# Patient Record
Sex: FEMALE | Race: WHITE | NOT HISPANIC OR LATINO | Employment: OTHER | URBAN - METROPOLITAN AREA
[De-identification: names, ages, dates, MRNs, and addresses within clinical notes are randomized per-mention and may not be internally consistent; named-entity substitution may affect disease eponyms.]

---

## 2019-11-22 ENCOUNTER — TELEPHONE (OUTPATIENT)
Dept: TRANSPLANT | Facility: CLINIC | Age: 59
End: 2019-11-22

## 2019-11-22 DIAGNOSIS — I50.9 CONGESTIVE HEART FAILURE, UNSPECIFIED HF CHRONICITY, UNSPECIFIED HEART FAILURE TYPE: Primary | ICD-10-CM

## 2019-12-09 ENCOUNTER — INITIAL CONSULT (OUTPATIENT)
Dept: TRANSPLANT | Facility: CLINIC | Age: 59
End: 2019-12-09
Payer: COMMERCIAL

## 2019-12-09 ENCOUNTER — CLINICAL SUPPORT (OUTPATIENT)
Dept: TRANSPLANT | Facility: CLINIC | Age: 59
End: 2019-12-09
Payer: COMMERCIAL

## 2019-12-09 ENCOUNTER — EDUCATION (OUTPATIENT)
Dept: TRANSPLANT | Facility: CLINIC | Age: 59
End: 2019-12-09

## 2019-12-09 ENCOUNTER — LAB VISIT (OUTPATIENT)
Dept: LAB | Facility: HOSPITAL | Age: 59
End: 2019-12-09
Attending: INTERNAL MEDICINE
Payer: COMMERCIAL

## 2019-12-09 ENCOUNTER — HOSPITAL ENCOUNTER (OUTPATIENT)
Dept: PULMONOLOGY | Facility: CLINIC | Age: 59
Discharge: HOME | End: 2019-12-09
Payer: COMMERCIAL

## 2019-12-09 VITALS — HEIGHT: 61 IN | BODY MASS INDEX: 24.47 KG/M2 | WEIGHT: 129.63 LBS

## 2019-12-09 VITALS — SYSTOLIC BLOOD PRESSURE: 102 MMHG | DIASTOLIC BLOOD PRESSURE: 61 MMHG | HEART RATE: 74 BPM

## 2019-12-09 DIAGNOSIS — I50.9 CONGESTIVE HEART FAILURE, UNSPECIFIED HF CHRONICITY, UNSPECIFIED HEART FAILURE TYPE: ICD-10-CM

## 2019-12-09 DIAGNOSIS — Z95.810 S/P ICD (INTERNAL CARDIAC DEFIBRILLATOR) PROCEDURE: ICD-10-CM

## 2019-12-09 DIAGNOSIS — I25.810 CORONARY ARTERY DISEASE INVOLVING CORONARY BYPASS GRAFT OF NATIVE HEART WITHOUT ANGINA PECTORIS: ICD-10-CM

## 2019-12-09 DIAGNOSIS — C50.912: ICD-10-CM

## 2019-12-09 DIAGNOSIS — Z95.1 S/P CABG (CORONARY ARTERY BYPASS GRAFT): ICD-10-CM

## 2019-12-09 DIAGNOSIS — C78.89: ICD-10-CM

## 2019-12-09 DIAGNOSIS — I50.22 CHRONIC SYSTOLIC CONGESTIVE HEART FAILURE: Primary | ICD-10-CM

## 2019-12-09 LAB
ALBUMIN SERPL BCP-MCNC: 4.3 G/DL (ref 3.5–5.2)
ALP SERPL-CCNC: 145 U/L (ref 55–135)
ALT SERPL W/O P-5'-P-CCNC: 21 U/L (ref 10–44)
ANION GAP SERPL CALC-SCNC: 6 MMOL/L (ref 8–16)
AST SERPL-CCNC: 19 U/L (ref 10–40)
BASOPHILS # BLD AUTO: 0.05 K/UL (ref 0–0.2)
BASOPHILS NFR BLD: 0.8 % (ref 0–1.9)
BILIRUB SERPL-MCNC: 0.6 MG/DL (ref 0.1–1)
BNP SERPL-MCNC: 560 PG/ML (ref 0–99)
BUN SERPL-MCNC: 13 MG/DL (ref 8–23)
CALCIUM SERPL-MCNC: 9.3 MG/DL (ref 8.7–10.5)
CHLORIDE SERPL-SCNC: 102 MMOL/L (ref 95–110)
CO2 SERPL-SCNC: 31 MMOL/L (ref 23–29)
CREAT SERPL-MCNC: 1 MG/DL (ref 0.5–1.4)
DIFFERENTIAL METHOD: ABNORMAL
EOSINOPHIL # BLD AUTO: 0.3 K/UL (ref 0–0.5)
EOSINOPHIL NFR BLD: 5.2 % (ref 0–8)
ERYTHROCYTE [DISTWIDTH] IN BLOOD BY AUTOMATED COUNT: 15.2 % (ref 11.5–14.5)
EST. GFR  (AFRICAN AMERICAN): >60 ML/MIN/1.73 M^2
EST. GFR  (NON AFRICAN AMERICAN): >60 ML/MIN/1.73 M^2
GLUCOSE SERPL-MCNC: 81 MG/DL (ref 70–110)
HCT VFR BLD AUTO: 43.3 % (ref 37–48.5)
HGB BLD-MCNC: 13.2 G/DL (ref 12–16)
IMM GRANULOCYTES # BLD AUTO: 0.01 K/UL (ref 0–0.04)
IMM GRANULOCYTES NFR BLD AUTO: 0.2 % (ref 0–0.5)
LYMPHOCYTES # BLD AUTO: 1.6 K/UL (ref 1–4.8)
LYMPHOCYTES NFR BLD: 27.2 % (ref 18–48)
MCH RBC QN AUTO: 27.6 PG (ref 27–31)
MCHC RBC AUTO-ENTMCNC: 30.5 G/DL (ref 32–36)
MCV RBC AUTO: 91 FL (ref 82–98)
MONOCYTES # BLD AUTO: 0.6 K/UL (ref 0.3–1)
MONOCYTES NFR BLD: 9.4 % (ref 4–15)
NEUTROPHILS # BLD AUTO: 3.4 K/UL (ref 1.8–7.7)
NEUTROPHILS NFR BLD: 57.2 % (ref 38–73)
NRBC BLD-RTO: 0 /100 WBC
PLATELET # BLD AUTO: 179 K/UL (ref 150–350)
PMV BLD AUTO: 11.7 FL (ref 9.2–12.9)
POTASSIUM SERPL-SCNC: 4.3 MMOL/L (ref 3.5–5.1)
PROT SERPL-MCNC: 7.2 G/DL (ref 6–8.4)
RBC # BLD AUTO: 4.78 M/UL (ref 4–5.4)
SODIUM SERPL-SCNC: 139 MMOL/L (ref 136–145)
TSH SERPL DL<=0.005 MIU/L-ACNC: 2.38 UIU/ML (ref 0.4–4)
WBC # BLD AUTO: 5.95 K/UL (ref 3.9–12.7)

## 2019-12-09 PROCEDURE — 99204 PR OFFICE/OUTPT VISIT, NEW, LEVL IV, 45-59 MIN: ICD-10-PCS | Mod: S$GLB,TXP,, | Performed by: INTERNAL MEDICINE

## 2019-12-09 PROCEDURE — 36415 COLL VENOUS BLD VENIPUNCTURE: CPT | Mod: TXP

## 2019-12-09 PROCEDURE — 84443 ASSAY THYROID STIM HORMONE: CPT | Mod: TXP

## 2019-12-09 PROCEDURE — 99204 OFFICE O/P NEW MOD 45 MIN: CPT | Mod: S$GLB,TXP,, | Performed by: INTERNAL MEDICINE

## 2019-12-09 PROCEDURE — 99999 PR PBB SHADOW E&M-EST. PATIENT-LVL III: ICD-10-PCS | Mod: PBBFAC,TXP,, | Performed by: INTERNAL MEDICINE

## 2019-12-09 PROCEDURE — 80053 COMPREHEN METABOLIC PANEL: CPT | Mod: TXP

## 2019-12-09 PROCEDURE — 99999 PR PBB SHADOW E&M-EST. PATIENT-LVL III: CPT | Mod: PBBFAC,TXP,, | Performed by: INTERNAL MEDICINE

## 2019-12-09 PROCEDURE — 83880 ASSAY OF NATRIURETIC PEPTIDE: CPT | Mod: TXP

## 2019-12-09 PROCEDURE — 94618 PULMONARY STRESS TESTING: CPT | Mod: NTX,S$GLB,, | Performed by: INTERNAL MEDICINE

## 2019-12-09 PROCEDURE — 85025 COMPLETE CBC W/AUTO DIFF WBC: CPT | Mod: TXP

## 2019-12-09 PROCEDURE — 94618 PULMONARY STRESS TESTING: ICD-10-PCS | Mod: NTX,S$GLB,, | Performed by: INTERNAL MEDICINE

## 2019-12-09 RX ORDER — SACUBITRIL AND VALSARTAN 24; 26 MG/1; MG/1
1 TABLET, FILM COATED ORAL 2 TIMES DAILY
Refills: 4 | COMMUNITY
Start: 2019-11-16

## 2019-12-09 RX ORDER — AMOXICILLIN 500 MG
CAPSULE ORAL DAILY
COMMUNITY

## 2019-12-09 RX ORDER — BETAMETHASONE DIPROPIONATE 0.5 MG/G
CREAM TOPICAL 2 TIMES DAILY
COMMUNITY

## 2019-12-09 RX ORDER — ALPRAZOLAM 1 MG/1
1 TABLET ORAL DAILY
COMMUNITY
End: 2023-10-24

## 2019-12-09 RX ORDER — FUROSEMIDE 40 MG/1
40 TABLET ORAL 2 TIMES DAILY
Refills: 1 | COMMUNITY
Start: 2019-11-16 | End: 2023-10-24

## 2019-12-09 RX ORDER — CARVEDILOL 12.5 MG/1
12.5 TABLET ORAL 2 TIMES DAILY
COMMUNITY

## 2019-12-09 RX ORDER — BIOTIN 10 MG
TABLET ORAL
COMMUNITY

## 2019-12-09 RX ORDER — UBIDECARENONE 30 MG
30 CAPSULE ORAL 3 TIMES DAILY
COMMUNITY

## 2019-12-09 RX ORDER — ASPIRIN 81 MG/1
81 TABLET ORAL DAILY
COMMUNITY

## 2019-12-09 RX ORDER — BUPROPION HYDROCHLORIDE 150 MG/1
150 TABLET ORAL DAILY
Refills: 98 | COMMUNITY
Start: 2019-10-26

## 2019-12-09 RX ORDER — CITALOPRAM 40 MG/1
20 TABLET, FILM COATED ORAL DAILY
Refills: 2 | COMMUNITY
Start: 2019-12-03

## 2019-12-09 RX ORDER — ATORVASTATIN CALCIUM 40 MG/1
40 TABLET, FILM COATED ORAL NIGHTLY
Refills: 5 | COMMUNITY
Start: 2019-11-17

## 2019-12-09 RX ORDER — POTASSIUM CHLORIDE 20 MEQ/1
20 TABLET, EXTENDED RELEASE ORAL DAILY
Refills: 0 | COMMUNITY
Start: 2019-12-02

## 2019-12-09 RX ORDER — CLOPIDOGREL BISULFATE 75 MG/1
75 TABLET ORAL DAILY
Refills: 2 | COMMUNITY
Start: 2019-12-02

## 2019-12-09 NOTE — PROGRESS NOTES
PRE-EDUCATION BOOKLET NOTE:    Met with Trixie Toth and had a brief discussion regarding the advanced heart failure evaluation process including options for heart transplantation and/or left ventricular assist device (LVAD) implantation.     Heart Transplant Educational Booklet given to patient, which included the following handouts:  · Treatment options for Advanced Heart Failure: A Referral Guide for patients  · Pre Heart Transplant Coordinator Team Contact Information   · Recipient Informed Consent   · Wellness Contract  · Multiple Listing Protocol and UNOS toll free numbers   · VAD Education Packet   · Advanced Directives  · 8 Step Plan for Heart Failure Patients     Significant History:  · Prior sternotomies: yes 2019  · ICD : Yes: [facility] placed Aug 2019  · Blood transfusions : Yes: January 2019  · Angiography : Yes: x2, [place] Nov 2018- Jan 2019   · Colonoscopy : Yes: 2008 (maybe)  in [place]  · Pap smear NA- GELACIO in 2007  · Mammogram NA- bilateral Mastesctomy 2007  · Tobacco history no  · Stroke history:  no  · Thromboembolism history:  no    Question and answer session was conducted.  Patient was accompanied by her brother and niece.  Advised to bring the educational packet to future appointments and/or admissions.  Patient was encouraged to contact the coordinator team with any questions or concerns.  Understanding verbalized.

## 2019-12-09 NOTE — PROGRESS NOTES
Subjective:   Initial evaluation of heart transplant candidacy.    HPI:  Ms. Toth is a 59 y.o. year old White female who has presents to be considered for advanced surgical options (LVAD/OHT).      History of CAD s/p CABG in 2018 subsequent angiogram revealed LIMA to LAD occluded SVG stenosis distal anastomosis SVG rca patent LV aneurysm. HF REF 2 hopsitalization for heart failure on entresto    FC III NYHA     History of breast Ca 11 years ago  CArcinoid 12 years ago            Past Medical History:   Diagnosis Date    Cancer     breast    Cardiomyopathy     CHF (congestive heart failure)     Hyperlipidemia     Hypertension     MVP (mitral valve prolapse)      Past Surgical History:   Procedure Laterality Date     SECTION      CHOLECYSTECTOMY      HYSTERECTOMY      MASTECTOMY      TONSILLECTOMY         No family history on file.    Review of Systems   Cardiovascular: Positive for dyspnea on exertion. Negative for orthopnea and paroxysmal nocturnal dyspnea.       Objective:   Blood pressure 102/61, pulse 74.body mass index is unknown because there is no height or weight on file.    Physical Exam   Constitutional: She is oriented to person, place, and time. She appears well-developed and well-nourished.   HENT:   Head: Normocephalic and atraumatic.   Mouth/Throat: No oropharyngeal exudate.   Eyes: Pupils are equal, round, and reactive to light. Conjunctivae and EOM are normal. Right eye exhibits no discharge. Left eye exhibits no discharge. No scleral icterus.   Neck: Normal range of motion. Neck supple. No JVD present. No tracheal deviation present. No thyromegaly present.   Cardiovascular: Normal rate and regular rhythm. Exam reveals gallop. Exam reveals no friction rub.   No murmur heard.  Pulmonary/Chest: Effort normal and breath sounds normal. No respiratory distress. She has no wheezes. She has no rales. She exhibits no tenderness.   Abdominal: Soft. Bowel sounds are normal. She  exhibits no distension and no mass. There is no tenderness. There is no rebound and no guarding.   Musculoskeletal: She exhibits no edema or tenderness.   Lymphadenopathy:     She has no cervical adenopathy.   Neurological: She is alert and oriented to person, place, and time. She has normal reflexes.   Skin: Skin is warm.   Psychiatric: She has a normal mood and affect. Her behavior is normal. Judgment and thought content normal.       Labs:      Chemistry        Component Value Date/Time     12/09/2019 0851    K 4.3 12/09/2019 0851     12/09/2019 0851    CO2 31 (H) 12/09/2019 0851    BUN 13 12/09/2019 0851    CREATININE 1.0 12/09/2019 0851    GLU 81 12/09/2019 0851        Component Value Date/Time    CALCIUM 9.3 12/09/2019 0851    ALKPHOS 145 (H) 12/09/2019 0851    AST 19 12/09/2019 0851    ALT 21 12/09/2019 0851    BILITOT 0.6 12/09/2019 0851    ESTGFRAFRICA >60.0 12/09/2019 0851    EGFRNONAA >60.0 12/09/2019 0851          No results found for: MG  Lab Results   Component Value Date    WBC 5.95 12/09/2019    HGB 13.2 12/09/2019    HCT 43.3 12/09/2019    MCV 91 12/09/2019     12/09/2019     BNP   Date Value Ref Range Status   12/09/2019 560 (H) 0 - 99 pg/mL Final     Comment:     Values of less than 100 pg/ml are consistent with non-CHF populations.     No results found for this or any previous visit.    Labs were reviewed with the patient.    Assessment:      1. Chronic systolic congestive heart failure    2. S/P CABG (coronary artery bypass graft)    3. Coronary artery disease involving coronary bypass graft of native heart without angina pectoris        Plan:   GISELA work up    Patient is now NYHA III ACC stage D  Recommend 2 gram sodium restriction and 1500cc fluid restriction.  Encourage physical activity with graded exercise program.  Requested patient to weigh themselves daily, and to notify us if their weight increases by more than 3 lbs in 1 day or 5 lbs in 1 week.     Transplant  Candidacy: Patient is a 59 y.o. year old female with heart failure is being seen for possible LVAD and OHT. In my opinion, she is  a suitable LVAD and OHT candidate. Patient did meet with MCS and/or pre-transplant coordinator at the end of this visit for workup. she will be work up for LVAD and GISELA       Discussed with the patient and family [facility] program outcomes as reported in INTERMACS (Interagency Registry for Mechanically Assisted Circulatory Support):    1 year survival = 92.7%  2 year survival = 86.7%  3 year survival = 86.7%    Patient and family acknowledged receipt of this information and all questions were answered.       UNOS Patient Status  Functional Status: 70% - Cares for self: unable to carry on normal activity or active work  Physical Capacity: Limited Mobility  Working for Income: No  If no, reason not working: Demands of Treatment    Alberto Canales MD

## 2019-12-09 NOTE — LETTER
December 9, 2019        Heriberto Maldonado  [address] [facility] [address]  Phone: [phone]  Fax: [phone]             [facility]  [address]  [facility] [address]  Phone: [phone]   Patient: Trixie Toth   MR Number: [id number]   YOB: 1960   Date of Visit: 12/9/2019       Dear Dr. Heriberto Maldonado    Thank you for referring Trixie Toth to me for evaluation. Attached you will find relevant portions of my assessment and plan of care.    If you have questions, please do not hesitate to call me. I look forward to following Trixie Toth along with you.    Sincerely,    Alberto Canales MD    Enclosure    If you would like to receive this communication electronically, please contact [email or web address] or [phone] to request [email or web address] access.    [email or web address] Link is a tool which provides read-only access to select patient information with whom you have a relationship. Its easy to use and provides real time access to review your patients record including encounter summaries, notes, results, and demographic information.    If you feel you have received this communication in error or would no longer like to receive these types of communications, please e-mail [email or web address]

## 2019-12-12 ENCOUNTER — TELEPHONE (OUTPATIENT)
Dept: TRANSPLANT | Facility: CLINIC | Age: 59
End: 2019-12-12

## 2019-12-12 NOTE — TELEPHONE ENCOUNTER
Contacted patient with f/u appt date; mailed slip.  Advised to bring education materials for review.  Contact information provided and encouraged to call with questions/concerns.  Understanding verbalized.

## 2020-01-06 ENCOUNTER — DOCUMENTATION ONLY (OUTPATIENT)
Dept: TRANSPLANT | Facility: CLINIC | Age: 60
End: 2020-01-06

## 2020-01-06 ENCOUNTER — OFFICE VISIT (OUTPATIENT)
Dept: TRANSPLANT | Facility: CLINIC | Age: 60
End: 2020-01-06
Payer: COMMERCIAL

## 2020-01-06 ENCOUNTER — EDUCATION (OUTPATIENT)
Dept: TRANSPLANT | Facility: CLINIC | Age: 60
End: 2020-01-06

## 2020-01-06 ENCOUNTER — CLINICAL SUPPORT (OUTPATIENT)
Dept: TRANSPLANT | Facility: CLINIC | Age: 60
End: 2020-01-06
Payer: COMMERCIAL

## 2020-01-06 ENCOUNTER — HOSPITAL ENCOUNTER (OUTPATIENT)
Facility: HOSPITAL | Age: 60
Discharge: HOME | End: 2020-01-07
Attending: INTERNAL MEDICINE | Admitting: INTERNAL MEDICINE
Payer: COMMERCIAL

## 2020-01-06 VITALS
HEART RATE: 62 BPM | SYSTOLIC BLOOD PRESSURE: 105 MMHG | DIASTOLIC BLOOD PRESSURE: 59 MMHG | WEIGHT: 134.5 LBS | HEIGHT: 61 IN | BODY MASS INDEX: 25.39 KG/M2

## 2020-01-06 DIAGNOSIS — C78.89: ICD-10-CM

## 2020-01-06 DIAGNOSIS — I50.22 CHRONIC SYSTOLIC CONGESTIVE HEART FAILURE: ICD-10-CM

## 2020-01-06 DIAGNOSIS — I50.9 HEART FAILURE: ICD-10-CM

## 2020-01-06 DIAGNOSIS — I50.9 CHF (CONGESTIVE HEART FAILURE): ICD-10-CM

## 2020-01-06 DIAGNOSIS — C50.912: ICD-10-CM

## 2020-01-06 DIAGNOSIS — I25.700 CORONARY ARTERY DISEASE INVOLVING CORONARY BYPASS GRAFT OF NATIVE HEART WITH UNSTABLE ANGINA PECTORIS: ICD-10-CM

## 2020-01-06 DIAGNOSIS — I50.20 SYSTOLIC HF (HEART FAILURE): ICD-10-CM

## 2020-01-06 DIAGNOSIS — I50.9 ACUTE ON CHRONIC CONGESTIVE HEART FAILURE, UNSPECIFIED HEART FAILURE TYPE: ICD-10-CM

## 2020-01-06 DIAGNOSIS — Z95.810 S/P ICD (INTERNAL CARDIAC DEFIBRILLATOR) PROCEDURE: Primary | ICD-10-CM

## 2020-01-06 DIAGNOSIS — I50.20 SYSTOLIC CONGESTIVE HEART FAILURE, UNSPECIFIED HF CHRONICITY: Primary | ICD-10-CM

## 2020-01-06 DIAGNOSIS — Z95.1 S/P CABG (CORONARY ARTERY BYPASS GRAFT): ICD-10-CM

## 2020-01-06 LAB
ALBUMIN SERPL BCP-MCNC: 4.2 G/DL (ref 3.5–5.2)
ALP SERPL-CCNC: 153 U/L (ref 55–135)
ALT SERPL W/O P-5'-P-CCNC: 26 U/L (ref 10–44)
ANION GAP SERPL CALC-SCNC: 9 MMOL/L (ref 8–16)
AST SERPL-CCNC: 24 U/L (ref 10–40)
BASOPHILS # BLD AUTO: 0.07 K/UL (ref 0–0.2)
BASOPHILS NFR BLD: 0.8 % (ref 0–1.9)
BILIRUB SERPL-MCNC: 0.7 MG/DL (ref 0.1–1)
BUN SERPL-MCNC: 14 MG/DL (ref 8–23)
CALCIUM SERPL-MCNC: 9.3 MG/DL (ref 8.7–10.5)
CHLORIDE SERPL-SCNC: 103 MMOL/L (ref 95–110)
CO2 SERPL-SCNC: 25 MMOL/L (ref 23–29)
CREAT SERPL-MCNC: 0.9 MG/DL (ref 0.5–1.4)
DIFFERENTIAL METHOD: ABNORMAL
EOSINOPHIL # BLD AUTO: 0.7 K/UL (ref 0–0.5)
EOSINOPHIL NFR BLD: 8.9 % (ref 0–8)
ERYTHROCYTE [DISTWIDTH] IN BLOOD BY AUTOMATED COUNT: 15.2 % (ref 11.5–14.5)
EST. GFR  (AFRICAN AMERICAN): >60 ML/MIN/1.73 M^2
EST. GFR  (NON AFRICAN AMERICAN): >60 ML/MIN/1.73 M^2
GLUCOSE SERPL-MCNC: 83 MG/DL (ref 70–110)
HCT VFR BLD AUTO: 43.1 % (ref 37–48.5)
HGB BLD-MCNC: 13.2 G/DL (ref 12–16)
IMM GRANULOCYTES # BLD AUTO: 0.02 K/UL (ref 0–0.04)
IMM GRANULOCYTES NFR BLD AUTO: 0.2 % (ref 0–0.5)
LYMPHOCYTES # BLD AUTO: 2.5 K/UL (ref 1–4.8)
LYMPHOCYTES NFR BLD: 30.2 % (ref 18–48)
MAGNESIUM SERPL-MCNC: 2 MG/DL (ref 1.6–2.6)
MCH RBC QN AUTO: 27.9 PG (ref 27–31)
MCHC RBC AUTO-ENTMCNC: 30.6 G/DL (ref 32–36)
MCV RBC AUTO: 91 FL (ref 82–98)
MONOCYTES # BLD AUTO: 0.7 K/UL (ref 0.3–1)
MONOCYTES NFR BLD: 8.4 % (ref 4–15)
NEUTROPHILS # BLD AUTO: 4.3 K/UL (ref 1.8–7.7)
NEUTROPHILS NFR BLD: 51.5 % (ref 38–73)
NRBC BLD-RTO: 0 /100 WBC
PHOSPHATE SERPL-MCNC: 3.9 MG/DL (ref 2.7–4.5)
PLATELET # BLD AUTO: 135 K/UL (ref 150–350)
PMV BLD AUTO: 12.2 FL (ref 9.2–12.9)
POTASSIUM SERPL-SCNC: 3.8 MMOL/L (ref 3.5–5.1)
PROT SERPL-MCNC: 7 G/DL (ref 6–8.4)
RBC # BLD AUTO: 4.73 M/UL (ref 4–5.4)
SODIUM SERPL-SCNC: 137 MMOL/L (ref 136–145)
WBC # BLD AUTO: 8.34 K/UL (ref 3.9–12.7)

## 2020-01-06 PROCEDURE — 99215 PR OFFICE/OUTPT VISIT, EST, LEVL V, 40-54 MIN: ICD-10-PCS | Mod: S$GLB,TXP,, | Performed by: INTERNAL MEDICINE

## 2020-01-06 PROCEDURE — 84100 ASSAY OF PHOSPHORUS: CPT | Mod: NTX

## 2020-01-06 PROCEDURE — 63600175 PHARM REV CODE 636 W HCPCS: Mod: NTX | Performed by: HOSPITALIST

## 2020-01-06 PROCEDURE — 36415 COLL VENOUS BLD VENIPUNCTURE: CPT | Mod: NTX

## 2020-01-06 PROCEDURE — 99999 PR PBB SHADOW E&M-EST. PATIENT-LVL III: CPT | Mod: PBBFAC,TXP,, | Performed by: INTERNAL MEDICINE

## 2020-01-06 PROCEDURE — 99215 OFFICE O/P EST HI 40 MIN: CPT | Mod: S$GLB,TXP,, | Performed by: INTERNAL MEDICINE

## 2020-01-06 PROCEDURE — 25000003 PHARM REV CODE 250: Mod: NTX | Performed by: HOSPITALIST

## 2020-01-06 PROCEDURE — G0378 HOSPITAL OBSERVATION PER HR: HCPCS | Mod: NTX

## 2020-01-06 PROCEDURE — 3008F PR BODY MASS INDEX (BMI) DOCUMENTED: ICD-10-PCS | Mod: CPTII,S$GLB,TXP, | Performed by: INTERNAL MEDICINE

## 2020-01-06 PROCEDURE — 20600001 HC STEP DOWN PRIVATE ROOM: Mod: NTX

## 2020-01-06 PROCEDURE — 83735 ASSAY OF MAGNESIUM: CPT | Mod: NTX

## 2020-01-06 PROCEDURE — 80053 COMPREHEN METABOLIC PANEL: CPT | Mod: NTX

## 2020-01-06 PROCEDURE — 93010 ELECTROCARDIOGRAM REPORT: CPT | Mod: NTX,,, | Performed by: INTERNAL MEDICINE

## 2020-01-06 PROCEDURE — 99220 PR INITIAL OBSERVATION CARE,LEVL III: ICD-10-PCS | Mod: NTX,,, | Performed by: INTERNAL MEDICINE

## 2020-01-06 PROCEDURE — 99220 PR INITIAL OBSERVATION CARE,LEVL III: CPT | Mod: NTX,,, | Performed by: INTERNAL MEDICINE

## 2020-01-06 PROCEDURE — 3008F BODY MASS INDEX DOCD: CPT | Mod: CPTII,S$GLB,TXP, | Performed by: INTERNAL MEDICINE

## 2020-01-06 PROCEDURE — 93005 ELECTROCARDIOGRAM TRACING: CPT | Mod: NTX

## 2020-01-06 PROCEDURE — 99999 PR PBB SHADOW E&M-EST. PATIENT-LVL III: ICD-10-PCS | Mod: PBBFAC,TXP,, | Performed by: INTERNAL MEDICINE

## 2020-01-06 PROCEDURE — G0379 DIRECT REFER HOSPITAL OBSERV: HCPCS | Mod: NTX

## 2020-01-06 PROCEDURE — 93010 EKG 12-LEAD: ICD-10-PCS | Mod: NTX,,, | Performed by: INTERNAL MEDICINE

## 2020-01-06 PROCEDURE — 85025 COMPLETE CBC W/AUTO DIFF WBC: CPT | Mod: NTX

## 2020-01-06 RX ORDER — ALPRAZOLAM 1 MG/1
1 TABLET ORAL DAILY
Status: DISCONTINUED | OUTPATIENT
Start: 2020-01-06 | End: 2020-01-07 | Stop reason: HOSPADM

## 2020-01-06 RX ORDER — ASPIRIN 81 MG/1
81 TABLET ORAL DAILY
Status: DISCONTINUED | OUTPATIENT
Start: 2020-01-06 | End: 2020-01-07 | Stop reason: HOSPADM

## 2020-01-06 RX ORDER — CITALOPRAM 20 MG/1
40 TABLET, FILM COATED ORAL DAILY
Status: DISCONTINUED | OUTPATIENT
Start: 2020-01-06 | End: 2020-01-07 | Stop reason: HOSPADM

## 2020-01-06 RX ORDER — ATORVASTATIN CALCIUM 20 MG/1
40 TABLET, FILM COATED ORAL NIGHTLY
Status: DISCONTINUED | OUTPATIENT
Start: 2020-01-06 | End: 2020-01-07 | Stop reason: HOSPADM

## 2020-01-06 RX ORDER — POTASSIUM CHLORIDE 20 MEQ/1
20 TABLET, EXTENDED RELEASE ORAL DAILY
Status: DISCONTINUED | OUTPATIENT
Start: 2020-01-06 | End: 2020-01-07 | Stop reason: HOSPADM

## 2020-01-06 RX ORDER — SODIUM CHLORIDE 0.9 % (FLUSH) 0.9 %
10 SYRINGE (ML) INJECTION
Status: DISCONTINUED | OUTPATIENT
Start: 2020-01-06 | End: 2020-01-07 | Stop reason: HOSPADM

## 2020-01-06 RX ORDER — ENOXAPARIN SODIUM 100 MG/ML
30 INJECTION SUBCUTANEOUS EVERY 24 HOURS
Status: DISCONTINUED | OUTPATIENT
Start: 2020-01-06 | End: 2020-01-06

## 2020-01-06 RX ORDER — CARVEDILOL 12.5 MG/1
12.5 TABLET ORAL 2 TIMES DAILY
Status: DISCONTINUED | OUTPATIENT
Start: 2020-01-06 | End: 2020-01-07 | Stop reason: HOSPADM

## 2020-01-06 RX ORDER — BUPROPION HYDROCHLORIDE 150 MG/1
150 TABLET ORAL DAILY
Status: DISCONTINUED | OUTPATIENT
Start: 2020-01-06 | End: 2020-01-07 | Stop reason: HOSPADM

## 2020-01-06 RX ORDER — FUROSEMIDE 40 MG/1
40 TABLET ORAL 2 TIMES DAILY
Status: DISCONTINUED | OUTPATIENT
Start: 2020-01-06 | End: 2020-01-07 | Stop reason: HOSPADM

## 2020-01-06 RX ORDER — CLOPIDOGREL BISULFATE 75 MG/1
75 TABLET ORAL DAILY
Status: DISCONTINUED | OUTPATIENT
Start: 2020-01-06 | End: 2020-01-07 | Stop reason: HOSPADM

## 2020-01-06 RX ORDER — ENOXAPARIN SODIUM 100 MG/ML
40 INJECTION SUBCUTANEOUS EVERY 24 HOURS
Status: DISCONTINUED | OUTPATIENT
Start: 2020-01-06 | End: 2020-01-06

## 2020-01-06 RX ADMIN — ATORVASTATIN CALCIUM 40 MG: 20 TABLET, FILM COATED ORAL at 08:01

## 2020-01-06 RX ADMIN — CITALOPRAM HYDROBROMIDE 40 MG: 20 TABLET ORAL at 05:01

## 2020-01-06 RX ADMIN — FUROSEMIDE 40 MG: 40 TABLET ORAL at 08:01

## 2020-01-06 RX ADMIN — ASPIRIN 81 MG: 81 TABLET, COATED ORAL at 05:01

## 2020-01-06 RX ADMIN — ENOXAPARIN SODIUM 30 MG: 100 INJECTION SUBCUTANEOUS at 05:01

## 2020-01-06 RX ADMIN — SACUBITRIL AND VALSARTAN 1 TABLET: 49; 51 TABLET, FILM COATED ORAL at 08:01

## 2020-01-06 RX ADMIN — ALPRAZOLAM 1 MG: 1 TABLET ORAL at 06:01

## 2020-01-06 NOTE — H&P
[facility] [address]  Heart Transplant  H&P    Patient Name: Trixie Toth  MRN: [id number]  Admission Date: 2020  Attending Physician: Louie Jeff MD  Primary Care Provider: Toan Peterson MD  Principal Problem:<principal problem not specified>    Subjective:     History of Present Illness:  59 y.o. year old White female with PMH of ICM wit EF 20-25%, breast cancer S/P masectomy, chemotherapy ,Appendix carcinoma s/p colectomy presented from Clinic for work up and diuresis. Patient was seen by Dr Canales in clinic where she was complaining of orthpnea, sob on exertion but patient endoreses she has 1 pillow orthopnea which didnt worse and she was able to walk one block and clinmb upstoars with out SOB. She denies lack of appetite, nausea, vomiting, LE edema. She endorses of having chest fullness and abd bloating sensation      Past Medical History:   Diagnosis Date    Cancer     breast    Cardiomyopathy     CHF (congestive heart failure)     Hyperlipidemia     Hypertension     MVP (mitral valve prolapse)        Past Surgical History:   Procedure Laterality Date     SECTION      CHOLECYSTECTOMY      HYSTERECTOMY      MASTECTOMY      TONSILLECTOMY         Review of patient's allergies indicates:   Allergen Reactions    Penicillins Rash    Vancomycin Rash    Phenergan [promethazine]      Restless leg syndrome    Diphenhydramine hcl Other (See Comments)       Current Facility-Administered Medications   Medication    ALPRAZolam tablet 1 mg    aspirin EC tablet 81 mg    atorvastatin tablet 40 mg    buPROPion TB24 tablet 150 mg    carvedilol tablet 12.5 mg    citalopram tablet 40 mg    clopidogrel tablet 75 mg    enoxaparin injection 30 mg    furosemide tablet 40 mg    potassium chloride SA CR tablet 20 mEq    sacubitril-valsartan 49-51 mg per tablet 1 tablet    sodium chloride 0.9% flush 10 mL     Family History     None        Tobacco Use    Smoking status: Never  Smoker    Smokeless tobacco: Never Used   Substance and Sexual Activity    Alcohol use: Not on file    Drug use: Not on file    Sexual activity: Not on file     Review of Systems   Constitutional: Negative.    HENT: Negative.    Eyes: Negative.    Respiratory: Negative.    Cardiovascular: Negative.    Gastrointestinal: Positive for abdominal distention.   Musculoskeletal: Negative.    Neurological: Negative.    Hematological: Negative.      Objective:     Vital Signs (Most Recent):  Temp: 97.8 °F (36.6 °C) (01/06/20 1542)  Pulse: 68 (01/06/20 1542)  Resp: 16 (01/06/20 1542)  BP: 115/61 (01/06/20 1542)  SpO2: 95 % (01/06/20 1542) Vital Signs (24h Range):  Temp:  [96.7 °F (35.9 °C)-97.8 °F (36.6 °C)] 97.8 °F (36.6 °C)  Pulse:  [62-69] 68  Resp:  [16-20] 16  SpO2:  [95 %-98 %] 95 %  BP: (105-115)/(59-61) 115/61     Patient Vitals for the past 72 hrs (Last 3 readings):   Weight   01/06/20 1300 60.5 kg (133 lb 6.1 oz)     Body mass index is 25.2 kg/m².    No intake or output data in the 24 hours ending 01/06/20 1707    Physical Exam   Constitutional: She is oriented to person, place, and time. She appears well-developed and well-nourished.   HENT:   Head: Normocephalic and atraumatic.   Eyes: Pupils are equal, round, and reactive to light.   Neck: Normal range of motion. Neck supple. No JVD present.   Cardiovascular: Normal rate and regular rhythm.   Pulmonary/Chest: Effort normal and breath sounds normal.   Abdominal: Soft. Bowel sounds are normal.   No hepatomegaly    Musculoskeletal: Normal range of motion.   Neurological: She is alert and oriented to person, place, and time.   Skin: Skin is warm.       Significant Labs:  CBC:  Recent Labs   Lab 01/06/20  1459   WBC 8.34   RBC 4.73   HGB 13.2   HCT 43.1   *   MCV 91   MCH 27.9   MCHC 30.6*     BNP:  No results for input(s): BNP in the last 168 hours.    Invalid input(s): BNPTRIAGELDONGO  CMP:  Recent Labs   Lab 01/06/20  1459   GLU 83   CALCIUM 9.3   ALBUMIN  4.2   PROT 7.0      K 3.8   CO2 25      BUN 14   CREATININE 0.9   ALKPHOS 153*   ALT 26   AST 24   BILITOT 0.7      Coagulation:   No results for input(s): PT, INR, APTT in the last 168 hours.  LDH:  No results for input(s): LDH in the last 72 hours.  Microbiology:  Microbiology Results (last 7 days)     ** No results found for the last 168 hours. **          I have reviewed all pertinent labs within the past 24 hours.    Diagnostic Results:  I have reviewed and interpreted all pertinent imaging results/findings within the past 24 hours.    Assessment/Plan:     Heart failure  ICM with EF 25%. euvolemic on exam. No JVP, no hepatomegaly , No LE edema.   On coreg and entresto at home. Will increase entresto to 49-51 mg BID. Cont home dose lasix. Will get Echo, CT head, chest, abd with pelvis wo contrast. 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 In remission since 12 years.     Coronary artery disease involving coronary bypass graft of native heart  CAD s/p CABG in December 2018 subsequent angiogram revealed LIMA to LAD occluded SVG stenosis distal anastomosis SVG rca   On aspirin, Plavix, Lipitor       Miguel Pierce MD  Heart Transplant  [facility]

## 2020-01-06 NOTE — LETTER
January 6, 2020        Heriberto Maldonado  [address] [facility] [address]  Phone: [phone]  Fax: [phone]             [facility]  [address]  [facility] [address]  Phone: [phone]   Patient: Trixie Toth   MR Number: [id number]   YOB: 1960   Date of Visit: 1/6/2020       Dear Dr. Heriberto Maldonado    Thank you for referring Trixie Toth to me for evaluation. Attached you will find relevant portions of my assessment and plan of care.    If you have questions, please do not hesitate to call me. I look forward to following Trixie Toth along with you.    Sincerely,    Alberto Canales MD    Enclosure    If you would like to receive this communication electronically, please contact [email or web address] or [phone] to request [employer] Link access.    [employer] Link is a tool which provides read-only access to select patient information with whom you have a relationship. Its easy to use and provides real time access to review your patients record including encounter summaries, notes, results, and demographic information.    If you feel you have received this communication in error or would no longer like to receive these types of communications, please e-mail [email or web address]

## 2020-01-06 NOTE — SUBJECTIVE & OBJECTIVE
Past Medical History:   Diagnosis Date    Cancer     breast    Cardiomyopathy     CHF (congestive heart failure)     Hyperlipidemia     Hypertension     MVP (mitral valve prolapse)        Past Surgical History:   Procedure Laterality Date     SECTION      CHOLECYSTECTOMY      HYSTERECTOMY      MASTECTOMY      TONSILLECTOMY         Review of patient's allergies indicates:   Allergen Reactions    Penicillins Rash    Vancomycin Rash    Phenergan [promethazine]      Restless leg syndrome    Diphenhydramine hcl Other (See Comments)       Current Facility-Administered Medications   Medication    ALPRAZolam tablet 1 mg    aspirin EC tablet 81 mg    atorvastatin tablet 40 mg    buPROPion TB24 tablet 150 mg    carvedilol tablet 12.5 mg    citalopram tablet 40 mg    clopidogrel tablet 75 mg    enoxaparin injection 30 mg    furosemide tablet 40 mg    potassium chloride SA CR tablet 20 mEq    sacubitril-valsartan 49-51 mg per tablet 1 tablet    sodium chloride 0.9% flush 10 mL     Family History     None        Tobacco Use    Smoking status: Never Smoker    Smokeless tobacco: Never Used   Substance and Sexual Activity    Alcohol use: Not on file    Drug use: Not on file    Sexual activity: Not on file     Review of Systems   Constitutional: Negative.    HENT: Negative.    Eyes: Negative.    Respiratory: Negative.    Cardiovascular: Negative.    Gastrointestinal: Positive for abdominal distention.   Musculoskeletal: Negative.    Neurological: Negative.    Hematological: Negative.      Objective:     Vital Signs (Most Recent):  Temp: 97.8 °F (36.6 °C) (20 1542)  Pulse: 68 (20 1542)  Resp: 16 (20 1542)  BP: 115/61 (20 1542)  SpO2: 95 % (20 1542) Vital Signs (24h Range):  Temp:  [96.7 °F (35.9 °C)-97.8 °F (36.6 °C)] 97.8 °F (36.6 °C)  Pulse:  [62-69] 68  Resp:  [16-20] 16  SpO2:  [95 %-98 %] 95 %  BP: (105-115)/(59-61) 115/61     Patient Vitals for the past 72  hrs (Last 3 readings):   Weight   01/06/20 1300 60.5 kg (133 lb 6.1 oz)     Body mass index is 25.2 kg/m².    No intake or output data in the 24 hours ending 01/06/20 1707    Physical Exam   Constitutional: She is oriented to person, place, and time. She appears well-developed and well-nourished.   HENT:   Head: Normocephalic and atraumatic.   Eyes: Pupils are equal, round, and reactive to light.   Neck: Normal range of motion. Neck supple. No JVD present.   Cardiovascular: Normal rate and regular rhythm.   Pulmonary/Chest: Effort normal and breath sounds normal.   Abdominal: Soft. Bowel sounds are normal.   No hepatomegaly    Musculoskeletal: Normal range of motion.   Neurological: She is alert and oriented to person, place, and time.   Skin: Skin is warm.       Significant Labs:  CBC:  Recent Labs   Lab 01/06/20  1459   WBC 8.34   RBC 4.73   HGB 13.2   HCT 43.1   *   MCV 91   MCH 27.9   MCHC 30.6*     BNP:  No results for input(s): BNP in the last 168 hours.    Invalid input(s): BNPTRIAGELBLO  CMP:  Recent Labs   Lab 01/06/20  1459   GLU 83   CALCIUM 9.3   ALBUMIN 4.2   PROT 7.0      K 3.8   CO2 25      BUN 14   CREATININE 0.9   ALKPHOS 153*   ALT 26   AST 24   BILITOT 0.7      Coagulation:   No results for input(s): PT, INR, APTT in the last 168 hours.  LDH:  No results for input(s): LDH in the last 72 hours.  Microbiology:  Microbiology Results (last 7 days)     ** No results found for the last 168 hours. **          I have reviewed all pertinent labs within the past 24 hours.    Diagnostic Results:  I have reviewed and interpreted all pertinent imaging results/findings within the past 24 hours.

## 2020-01-06 NOTE — PROGRESS NOTES
EDUCATION NOTE:    Trixie Toth was seen today for pre-heart transplant education.  Patient informed consent to undergo heart transplant evaluation work-up.  Thorough pre-transplant education conducted.      Information presented included:  · Evaluation process  · Members of the transplant team  · Selection committee members and role of the committee  · Listing process for transplant  · Different listing designations, including status 7  · 1-year graft survival statistics  · LVAD as bridge to transplant or DT  · Need to reach patient within 15 minutes of donor offer  · CDC high risk donors  · Blood transfusions  · Process for matching donor with recipient  · Need for weight loss and how it relates to the wait time  · Post-transplant immunosuppression for life with need to be able to afford post-transplant medications  · Need for a caregiver to be with them at all times beginning with discharge from ICU, through at least the first 6 weeks post-transplant  · Need to find local housing for the first 6 weeks post-transplant  · How to reach team members at any time  · UNOS website with written instructions regarding how to look up information specific to [facility] transplant program  · Use of Hepatitis C organs    Patient was accompanied by her step brother, Harriet Drake. Question and answer session took place.  Understanding verbalized.  Copy provided.

## 2020-01-06 NOTE — ASSESSMENT & PLAN NOTE
CAD s/p CABG in December 2018 subsequent angiogram revealed LIMA to LAD occluded SVG stenosis distal anastomosis SVG rca   On aspirin, Plavix, Lipitor

## 2020-01-06 NOTE — HPI
59 y.o. year old White female with PMH of ICM wit EF 20-25%, breast cancer S/P masectomy, chemotherapy ,Appendix carcinoma s/p colectomy presented from Clinic for work up and diuresis. Patient was seen by Dr Canales in clinic where she was complaining of orthpnea, sob on exertion but patient endoreses she has 1 pillow orthopnea which didnt worse and she was able to walk one block and clinmb upstoars with out SOB. She denies lack of appetite, nausea, vomiting, LE edema. She endorses of having chest fullness and abd bloating sensation

## 2020-01-06 NOTE — ASSESSMENT & PLAN NOTE
ICM with EF 25%. euvolemic on exam. No JVP, no hepatomegaly , No LE edema.   On coreg and entresto at home. Will increase entresto to 49-51 mg BID. Cont home dose lasix. Will get Echo, CT head, chest, abd with pelvis wo contrast. Will start her on pathway

## 2020-01-06 NOTE — PROGRESS NOTES
Subjective:   Initial evaluation of heart transplant candidacy.    HPI:  Ms. Toth is a 59 y.o. year old White female who has presents to be considered for advanced surgical options (LVAD/OHT).      History of CAD s/p CABG in 2018 subsequent angiogram revealed LIMA to LAD occluded SVG stenosis distal anastomosis SVG rca patent LV aneurysm. HF REF 2 hopsitalization for heart failure on entresto    FC III NYHA     History of breast Ca 11 years ago  CArcinoid 12 years ago        COmes to clinic and she states that she has had more shortness and fluid retention since las visit some orthopnea and PND            Past Medical History:   Diagnosis Date    Cancer     breast    Cardiomyopathy     CHF (congestive heart failure)     Hyperlipidemia     Hypertension     MVP (mitral valve prolapse)      Past Surgical History:   Procedure Laterality Date     SECTION      CHOLECYSTECTOMY      HYSTERECTOMY      MASTECTOMY      TONSILLECTOMY         No family history on file.    Review of Systems   Cardiovascular: Positive for dyspnea on exertion. Negative for orthopnea and paroxysmal nocturnal dyspnea.       Objective:   There were no vitals taken for this visit.body mass index is unknown because there is no height or weight on file.    Physical Exam   Constitutional: She is oriented to person, place, and time. She appears well-developed and well-nourished.   HENT:   Head: Normocephalic and atraumatic.   Mouth/Throat: No oropharyngeal exudate.   Eyes: Pupils are equal, round, and reactive to light. Conjunctivae and EOM are normal. Right eye exhibits no discharge. Left eye exhibits no discharge. No scleral icterus.   Neck: Normal range of motion. Neck supple. No JVD present. No tracheal deviation present. No thyromegaly present.   Cardiovascular: Normal rate and regular rhythm. Exam reveals gallop. Exam reveals no friction rub.   No murmur heard.  Pulmonary/Chest: Effort normal and breath sounds normal. No  respiratory distress. She has no wheezes. She has no rales. She exhibits no tenderness.   Abdominal: Soft. Bowel sounds are normal. She exhibits no distension and no mass. There is no tenderness. There is no rebound and no guarding.   Musculoskeletal: She exhibits no edema or tenderness.   Lymphadenopathy:     She has no cervical adenopathy.   Neurological: She is alert and oriented to person, place, and time. She has normal reflexes.   Skin: Skin is warm.   Psychiatric: She has a normal mood and affect. Her behavior is normal. Judgment and thought content normal.       Labs:      Chemistry        Component Value Date/Time     12/09/2019 0851    K 4.3 12/09/2019 0851     12/09/2019 0851    CO2 31 (H) 12/09/2019 0851    BUN 13 12/09/2019 0851    CREATININE 1.0 12/09/2019 0851    GLU 81 12/09/2019 0851        Component Value Date/Time    CALCIUM 9.3 12/09/2019 0851    ALKPHOS 145 (H) 12/09/2019 0851    AST 19 12/09/2019 0851    ALT 21 12/09/2019 0851    BILITOT 0.6 12/09/2019 0851    ESTGFRAFRICA >60.0 12/09/2019 0851    EGFRNONAA >60.0 12/09/2019 0851          No results found for: MG  Lab Results   Component Value Date    WBC 5.95 12/09/2019    HGB 13.2 12/09/2019    HCT 43.3 12/09/2019    MCV 91 12/09/2019     12/09/2019     BNP   Date Value Ref Range Status   12/09/2019 560 (H) 0 - 99 pg/mL Final     Comment:     Values of less than 100 pg/ml are consistent with non-CHF populations.     No results found for this or any previous visit.    Labs were reviewed with the patient.    Assessment:      1. S/P ICD (internal cardiac defibrillator) procedure    2. S/P CABG (coronary artery bypass graft)    3. Coronary artery disease involving coronary bypass graft of native heart with unstable angina pectoris    4. Chronic systolic congestive heart failure    5. 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 WE will admit for IV diuresis and  possible inotropic agents     Patient is now NYHA III ACC stage D  Recommend 2 gram sodium restriction and 1500cc fluid restriction.  Encourage physical activity with graded exercise program.  Requested patient to weigh themselves daily, and to notify us if their weight increases by more than 3 lbs in 1 day or 5 lbs in 1 week.     Transplant Candidacy: Patient is a 59 y.o. year old female with heart failure is being seen for possible LVAD and OHT. In my opinion, she is  a suitable LVAD and OHT candidate. Patient did meet with MCS and/or pre-transplant coordinator at the end of this visit for workup. she will be work up for LVAD and GISELA       Discussed with the patient and family [facility] program outcomes as reported in INTERMACS (Interagency Registry for Mechanically Assisted Circulatory Support):    1 year survival = 92.7%  2 year survival = 86.7%  3 year survival = 86.7%    Patient and family acknowledged receipt of this information and all questions were answered.       UNOS Patient Status  Functional Status: 70% - Cares for self: unable to carry on normal activity or active work  Physical Capacity: Limited Mobility  Working for Income: No  If no, reason not working: Demands of Treatment    Alberto Canales MD

## 2020-01-06 NOTE — PROGRESS NOTES
At the request of Dr. Canales, I have been asked to meet patient and provide VAD education. Introduced self and reason for visit. Pt and brother AAAO.  Provided phase 1 written VAD education. Included in Phase 1 folder is the following:     Evaluation Eval for [facility]  VAD support flyer  Cecilia: Living a more active life  Living with hVAD system  [employer] pamphlet  Picture of 3 VADs offered at [facility]    Explained that we use 3 different types of pumps here and information on pumps is in the black folder. I explained the work up process as well.     Explained to look over the entire contents and read Evaluation Eval for [facility] acknowledgement form.  Also explained that they should bring this folder with them to all clinic visits and if they are admitted to the hospital so that we can continue education as needed. Should there be any questions, please write them down and bring with you or feel free to call and we can talk on the phone. All questions answered to their satisfaction as evidence by verbal acknowledgement.

## 2020-01-07 VITALS
RESPIRATION RATE: 16 BRPM | TEMPERATURE: 98 F | SYSTOLIC BLOOD PRESSURE: 86 MMHG | HEIGHT: 61 IN | OXYGEN SATURATION: 97 % | DIASTOLIC BLOOD PRESSURE: 50 MMHG | HEART RATE: 60 BPM | WEIGHT: 131.81 LBS | BODY MASS INDEX: 24.89 KG/M2

## 2020-01-07 DIAGNOSIS — I50.20 SYSTOLIC CONGESTIVE HEART FAILURE, UNSPECIFIED HF CHRONICITY: Primary | ICD-10-CM

## 2020-01-07 LAB
ABO + RH BLD: NORMAL
ANION GAP SERPL CALC-SCNC: 10 MMOL/L (ref 8–16)
ASCENDING AORTA: 3.46 CM
AV INDEX (PROSTH): 0.63
AV MEAN GRADIENT: 4 MMHG
AV PEAK GRADIENT: 6 MMHG
AV VALVE AREA: 2.01 CM2
AV VELOCITY RATIO: 0.6
BLD GP AB SCN CELLS X3 SERPL QL: NORMAL
BSA FOR ECHO PROCEDURE: 1.61 M2
BUN SERPL-MCNC: 15 MG/DL (ref 8–23)
CALCIUM SERPL-MCNC: 8.8 MG/DL (ref 8.7–10.5)
CHLORIDE SERPL-SCNC: 105 MMOL/L (ref 95–110)
CO2 SERPL-SCNC: 27 MMOL/L (ref 23–29)
CREAT SERPL-MCNC: 1 MG/DL (ref 0.5–1.4)
CV ECHO LV RWT: 0.34 CM
DOP CALC AO PEAK VEL: 1.22 M/S
DOP CALC AO VTI: 23.54 CM
DOP CALC LVOT AREA: 3.2 CM2
DOP CALC LVOT DIAMETER: 2.02 CM
DOP CALC LVOT PEAK VEL: 0.73 M/S
DOP CALC LVOT STROKE VOLUME: 47.41 CM3
DOP CALCLVOT PEAK VEL VTI: 14.8 CM
E WAVE DECELERATION TIME: 268.64 MSEC
E/A RATIO: 0.34
ECHO LV POSTERIOR WALL: 0.91 CM (ref 0.6–1.1)
EST. GFR  (AFRICAN AMERICAN): >60 ML/MIN/1.73 M^2
EST. GFR  (NON AFRICAN AMERICAN): >60 ML/MIN/1.73 M^2
FRACTIONAL SHORTENING: 21 % (ref 28–44)
GLUCOSE SERPL-MCNC: 91 MG/DL (ref 70–110)
INTERVENTRICULAR SEPTUM: 0.86 CM (ref 0.6–1.1)
LA MAJOR: 4.97 CM
LA MINOR: 5.03 CM
LA WIDTH: 3.6 CM
LEFT ATRIUM SIZE: 3.6 CM
LEFT ATRIUM VOLUME INDEX: 34.8 ML/M2
LEFT ATRIUM VOLUME: 55.08 CM3
LEFT INTERNAL DIMENSION IN SYSTOLE: 4.2 CM (ref 2.1–4)
LEFT VENTRICLE DIASTOLIC VOLUME INDEX: 85.06 ML/M2
LEFT VENTRICLE DIASTOLIC VOLUME: 134.61 ML
LEFT VENTRICLE MASS INDEX: 108 G/M2
LEFT VENTRICLE SYSTOLIC VOLUME INDEX: 49.6 ML/M2
LEFT VENTRICLE SYSTOLIC VOLUME: 78.53 ML
LEFT VENTRICULAR INTERNAL DIMENSION IN DIASTOLE: 5.29 CM (ref 3.5–6)
LEFT VENTRICULAR MASS: 170.21 G
MV PEAK A VEL: 1.02 M/S
MV PEAK E VEL: 0.35 M/S
PISA TR MAX VEL: 2.02 M/S
POTASSIUM SERPL-SCNC: 4.5 MMOL/L (ref 3.5–5.1)
PULM VEIN S/D RATIO: 0.89
PV PEAK D VEL: 0.35 M/S
PV PEAK S VEL: 0.31 M/S
RA MAJOR: 4.58 CM
RA PRESSURE: 3 MMHG
RA WIDTH: 3.28 CM
RIGHT VENTRICULAR END-DIASTOLIC DIMENSION: 3.72 CM
SINUS: 3.09 CM
SODIUM SERPL-SCNC: 142 MMOL/L (ref 136–145)
STJ: 3.13 CM
TR MAX PG: 16 MMHG
TRICUSPID ANNULAR PLANE SYSTOLIC EXCURSION: 1.06 CM
TV REST PULMONARY ARTERY PRESSURE: 19 MMHG

## 2020-01-07 PROCEDURE — 99204 OFFICE O/P NEW MOD 45 MIN: CPT | Mod: NTX,,, | Performed by: NURSE PRACTITIONER

## 2020-01-07 PROCEDURE — 97165 OT EVAL LOW COMPLEX 30 MIN: CPT | Mod: NTX

## 2020-01-07 PROCEDURE — G0378 HOSPITAL OBSERVATION PER HR: HCPCS | Mod: NTX

## 2020-01-07 PROCEDURE — 86901 BLOOD TYPING SEROLOGIC RH(D): CPT | Mod: NTX

## 2020-01-07 PROCEDURE — 36415 COLL VENOUS BLD VENIPUNCTURE: CPT | Mod: NTX

## 2020-01-07 PROCEDURE — 97161 PT EVAL LOW COMPLEX 20 MIN: CPT | Mod: NTX

## 2020-01-07 PROCEDURE — 63600175 PHARM REV CODE 636 W HCPCS: Mod: NTX | Performed by: INTERNAL MEDICINE

## 2020-01-07 PROCEDURE — 80048 BASIC METABOLIC PNL TOTAL CA: CPT | Mod: NTX

## 2020-01-07 PROCEDURE — 25000003 PHARM REV CODE 250: Mod: NTX | Performed by: HOSPITALIST

## 2020-01-07 PROCEDURE — 99204 PR OFFICE/OUTPT VISIT, NEW, LEVL IV, 45-59 MIN: ICD-10-PCS | Mod: NTX,,, | Performed by: NURSE PRACTITIONER

## 2020-01-07 RX ADMIN — SACUBITRIL AND VALSARTAN 1 TABLET: 24; 26 TABLET, FILM COATED ORAL at 08:01

## 2020-01-07 RX ADMIN — POTASSIUM CHLORIDE 20 MEQ: 1500 TABLET, EXTENDED RELEASE ORAL at 08:01

## 2020-01-07 RX ADMIN — HUMAN ALBUMIN MICROSPHERES AND PERFLUTREN 0.66 MG: 10; .22 INJECTION, SOLUTION INTRAVENOUS at 10:01

## 2020-01-07 RX ADMIN — ASPIRIN 81 MG: 81 TABLET, COATED ORAL at 08:01

## 2020-01-07 RX ADMIN — CITALOPRAM HYDROBROMIDE 40 MG: 20 TABLET ORAL at 08:01

## 2020-01-07 RX ADMIN — FUROSEMIDE 40 MG: 40 TABLET ORAL at 08:01

## 2020-01-07 RX ADMIN — CLOPIDOGREL BISULFATE 75 MG: 75 TABLET ORAL at 08:01

## 2020-01-07 RX ADMIN — CARVEDILOL 12.5 MG: 12.5 TABLET, FILM COATED ORAL at 08:01

## 2020-01-07 RX ADMIN — BUPROPION HYDROCHLORIDE 150 MG: 150 TABLET, FILM COATED, EXTENDED RELEASE ORAL at 08:01

## 2020-01-07 NOTE — PLAN OF CARE
Pt free of falls and injury during shift. POC reviewed with pt VS stable and AAox4. SR On telemetry. CT of head and abdomen completed. Coreg held due to BP. No acute events noted at this time. No complaints. Yellow non-slip socks on pt. Bed low and locked, call light with in reach. Will continue to monitor.

## 2020-01-07 NOTE — ASSESSMENT & PLAN NOTE
CT does not preclude patient from advanced options. Dr. Kolb to review and give final recommendations.

## 2020-01-07 NOTE — PT/OT/SLP EVAL
"Occupational Therapy   Evaluation and Discharge Note    Name: Trixie Toth  MRN: [id number]  Admitting Diagnosis:  CHF (congestive heart failure)      Recommendations:     Discharge Recommendations: home  Discharge Equipment Recommendations:  none  Barriers to discharge:  None    Assessment:     Trixie Toth is a 59 y.o. female with a medical diagnosis of CHF (congestive heart failure). Patient presents to hospital for potential LVAD/OHT work up. At this time, patient is functioning at their prior level of function and does not require further acute OT services.     Plan:     During this hospitalization, patient does not require further acute OT services.  Please re-consult if situation changes.    · Plan of Care Reviewed with: patient    Subjective     Chief Complaint: none stated  Patient/Family Comments/goals: Patient stated "I feel good. I just have a bad heart".    Occupational Profile:  Living Environment: Patient lives alone in [facility] in [place], [place]. Patient has walk in shower and tub/shower combo.  Previous level of function: Independent with ADLs, IADLs, driving, etc.  Roles and Routines: mother; does not work; enjoys reading  Equipment Used at home: none  Assistance upon Discharge: Family and friends will be able to assist    Patients cultural, spiritual, Adventism conflicts given the current situation: no    Objective:     Communicated with: RN prior to session. Patient found on sofa with telemetry upon OT entry to room.    General Precautions: Standard,     Orthopedic Precautions:    Braces:       Occupational Performance:    Bed Mobility:     · Not assessed    Functional Mobility/Transfers:  · Patient completed Sit <> Stand Transfer with independence with no assistive device   · Functional Mobility: Patient ambulated in hallway community distances using no AD with independence. Patient did not report any SOB or fatigue during gait.    Activities of Daily Living:  · Toileting: independence donning " pants    Cognitive/Visual Perceptual:  Cognitive/Psychosocial Skills:     -       Oriented to: Person, Place, Time and Situation   -       Follows Commands/attention:Follows multistep  commands  -       Communication: clear/fluent  -       Memory: No Deficits noted  -       Safety awareness/insight to disability: intact   -       Mood/Affect/Coping skills/emotional control: Appropriate to situation, Cooperative and Pleasant    Physical Exam:  Postural examination/scapula alignment:    -       No postural abnormalities identified  Upper Extremity Range of Motion:     -       Right Upper Extremity: WNL  -       Left Upper Extremity: WNL  Upper Extremity Strength:    -       Right Upper Extremity: WNL  -       Left Upper Extremity: WNL   Strength:    -       Right Upper Extremity: WNL  -       Left Upper Extremity: WNL  Fine Motor Coordination:    -       Intact  Gross motor coordination:   WFL    AMPAC 6 Click ADL:  AMPAC Total Score: 24    Treatment & Education:   Reviewed sternal precautions in preparation for patient deciding to undergo surgery for advanced treatment options. Patient able to demonstrate sit <> stand without using UEs with independence.   Therapist provided facilitation and instruction of proper body mechanics, energy conservation, and fall prevention strategies during tasks listed above.   Instructed patient to sit in bedside chair daily to increase OOB/activity tolerance.   Instructed patient to use call light to have nursing staff assist with transfers.    Educated patient on OT POC and answered all questions within OT scope of practice.   Whiteboard updated   Education:  Patient left on sofa with all lines intact and call button in reach    GOALS:   Multidisciplinary Problems     Occupational Therapy Goals     Not on file          Multidisciplinary Problems (Resolved)        Problem: Occupational Therapy Goal    Goal Priority Disciplines Outcome Interventions   Occupational Therapy  Goal   (Resolved)     OT, PT/OT Met    Description:  Skilled OT services not necessary at this time secondary to patient performing ADLs at [facility]. Patient in agreement. Please re-consult OT if change in patient's functional status is noted.                       History:     Past Medical History:   Diagnosis Date    Cancer     breast    Cardiomyopathy     CHF (congestive heart failure)     Hyperlipidemia     Hypertension     MVP (mitral valve prolapse)        Past Surgical History:   Procedure Laterality Date     SECTION      CHOLECYSTECTOMY      HYSTERECTOMY      MASTECTOMY      TONSILLECTOMY         Time Tracking:     OT Date of Treatment: 20  OT Start Time: 1305  OT Stop Time: 1320  OT Total Time (min): 15 min    Billable Minutes:Evaluation 15    Haydee Hernandez OT  2020

## 2020-01-07 NOTE — PLAN OF CARE
PT evaluation complete. No goals established as pt is baseline with mobility and has no acute PT needs at this time. D/C from PT services.    Danae Whelan, PT, DPT   1/7/2020  [phone]

## 2020-01-07 NOTE — NURSING TRANSFER
Nursing Transfer Note      1/7/2020     Transfer From: CT    Transfer via wheelchair    Transfer with cardiac monitoring    Transported by transporter    Medicines sent: no        Patient reassessed at: 1/6/2020 1930    Upon arrival to floor: patient oriented to room, call bell in reach and bed in lowest position

## 2020-01-07 NOTE — CONSULTS
[facility]  Cardiothoracic Surgery  Consult Note    Patient Name: Trixie Toth  MRN: [id number]  Admission Date: 1/6/2020  Attending Physician: Louie Jeff MD  Referring Provider: Louie Jeff MD    Patient information was obtained from patient and past medical records.     Inpatient consult to Cardiothoracic Surgery  Consult performed by: Bronwyn Calderon NP  Consult ordered by: Javier Zamora NP  Reason for consult: Advanced options         Subjective:     Principal Problem: <principal problem not specified>    History of Present Illness: 59 y.o. year old White female with PMH of ICM wit EF 20-25%, breast cancer S/P masectomy, chemotherapy ,Appendix carcinoma s/p colectomy presented from Clinic for work up and diuresis. Patient was seen by Dr Canales in clinic where she was complaining of orthpnea, sob on exertion but patient endoreses she has 1 pillow orthopnea which didnt worse and she was able to walk one block and clinmb upstoars with out SOB. She denies lack of appetite, nausea, vomiting, LE edema. She endorses of having chest fullness and abd bloating sensation       No current facility-administered medications on file prior to encounter.      Current Outpatient Medications on File Prior to Encounter   Medication Sig    ALPRAZolam (XANAX) 1 MG tablet Take 1 mg by mouth Daily.    aspirin (ECOTRIN) 81 MG EC tablet Take 81 mg by mouth once daily.    atorvastatin (LIPITOR) 40 MG tablet Take 40 mg by mouth every evening.    augmented betamethasone dipropionate (DIPROLENE-AF) 0.05 % cream Apply topically 2 (two) times daily.    bromelains 500 mg Tab Take by mouth.    buPROPion (WELLBUTRIN XL) 150 MG TB24 tablet Take 150 mg by mouth once daily.    carvedilol (COREG) 12.5 MG tablet Take 12.5 mg by mouth 2 (two) times daily.    citalopram (CELEXA) 40 MG tablet Take 40 mg by mouth once daily.    clopidogrel (PLAVIX) 75 mg tablet Take 75 mg by mouth once daily.    co-enzyme Q-10  30 mg capsule Take 30 mg by mouth 3 (three) times daily.    ENTRESTO 24-26 mg per tablet Take 1 tablet by mouth 2 (two) times daily.    furosemide (LASIX) 40 MG tablet Take 40 mg by mouth 2 (two) times daily.    omega-3 fatty acids/fish oil (FISH OIL-OMEGA-3 FATTY ACIDS) 300-1,000 mg capsule Take by mouth once daily.    potassium chloride SA (K-DUR,KLOR-CON) 20 MEQ tablet Take 20 mEq by mouth once daily.       Review of patient's allergies indicates:   Allergen Reactions    Penicillins Rash    Vancomycin Rash    Phenergan [promethazine]      Restless leg syndrome    Diphenhydramine hcl Other (See Comments)       Past Medical History:   Diagnosis Date    Cancer     breast    Cardiomyopathy     CHF (congestive heart failure)     Hyperlipidemia     Hypertension     MVP (mitral valve prolapse)      Past Surgical History:   Procedure Laterality Date     SECTION      CHOLECYSTECTOMY      HYSTERECTOMY      MASTECTOMY      TONSILLECTOMY       Family History     None        Tobacco Use    Smoking status: Never Smoker    Smokeless tobacco: Never Used   Substance and Sexual Activity    Alcohol use: Not on file    Drug use: Not on file    Sexual activity: Not on file     Review of Systems   Constitutional: Negative for activity change and fatigue.   Respiratory: Negative for cough and shortness of breath.    Cardiovascular: Negative for chest pain, palpitations and leg swelling.   Gastrointestinal: Negative for abdominal pain, nausea and vomiting.   Endocrine: Negative for polydipsia, polyphagia and polyuria.   Genitourinary: Negative for dysuria.   Musculoskeletal: Negative for gait problem.   Skin: Negative for rash.   Allergic/Immunologic: Negative for immunocompromised state.   Neurological: Negative for dizziness, syncope and weakness.   Hematological: Does not bruise/bleed easily.   Psychiatric/Behavioral: Negative for behavioral problems.     Objective:     Vital Signs (Most  Recent):  Temp: 97.5 °F (36.4 °C) (01/07/20 0738)  Pulse: 60 (01/07/20 1000)  Resp: 16 (01/07/20 0738)  BP: (!) 96/58 (01/07/20 0738)  SpO2: 98 % (01/07/20 0738) Vital Signs (24h Range):  Temp:  [96.7 °F (35.9 °C)-98.8 °F (37.1 °C)] 97.5 °F (36.4 °C)  Pulse:  [60-80] 60  Resp:  [16-20] 16  SpO2:  [95 %-98 %] 98 %  BP: ()/(50-61) 96/58     Weight: 59.8 kg (131 lb 13.4 oz)  Body mass index is 24.91 kg/m².    SpO2: 98 %  O2 Device (Oxygen Therapy): room air     Intake/Output - Last 3 Shifts       01/05 0700 - 01/06 0659 01/06 0700 - 01/07 0659 01/07 0700 - 01/08 0659    P.O.  660 240    Total Intake(mL/kg)  660 (10.9) 240 (4)    Urine (mL/kg/hr)  1050     Total Output  1050     Net  -390 +240                  Lines/Drains/Airways     Peripheral Intravenous Line                 Peripheral IV - Single Lumen 01/06/20 0949 20 G Anterior;Left;Proximal Forearm 1 day              Physical Exam   Constitutional: She is oriented to person, place, and time. She appears well-developed and well-nourished.   HENT:   Head: Normocephalic.   Nose: Nose normal.   Eyes: EOM are normal.   Neck: Normal range of motion.   Cardiovascular: Normal rate, regular rhythm and normal heart sounds.   Pulmonary/Chest: Effort normal and breath sounds normal.   Abdominal: Soft.   Musculoskeletal: Normal range of motion.   Neurological: She is alert and oriented to person, place, and time.   Skin: Skin is warm, dry and intact.   Sternal Incision CDI   Psychiatric: She has a normal mood and affect.       Significant Labs:  BMP:   Recent Labs   Lab 01/06/20  1459 01/07/20  0414   GLU 83 91    142   K 3.8 4.5    105   CO2 25 27   BUN 14 15   CREATININE 0.9 1.0   CALCIUM 9.3 8.8   MG 2.0  --      CBC:   Recent Labs   Lab 01/06/20  1459   WBC 8.34   RBC 4.73   HGB 13.2   HCT 43.1   *   MCV 91   MCH 27.9   MCHC 30.6*       Significant Diagnostics:  ECHO:   · Eccentric left ventricular hypertrophy. Severely decreased left ventricular  systolic function. The estimated ejection fraction is 10%.  · Local segmental wall motion abnormalities.  · Mild left atrial enlargement.  · Grade I (mild) left ventricular diastolic dysfunction consistent with impaired relaxation.  · Mild tricuspid regurgitation.  · Normal right ventricular systolic function.  · Normal central venous pressure (3 mmHg).  · The estimated PA systolic pressure is 19 mmHg.     No LV thrombus with echo contrast.    LV 5.2cm  TAPSE 1.06cm    Assessment/Plan:     NYHA Score: NYHA II: slight limitation of physical activity, comfortable at rest    Heart failure  Echo today report LV of 5.2 at this time patient is not a candidate for MCS. Continue work up for OHT, CT does not preclude patient from advance options.  Dr. Kolb to review and give final recommendations.         Thank you for your consult. I will follow-up with patient. Please contact us if you have any additional questions.    Bronwyn Calderon NP  Cardiothoracic Surgery  [facility]

## 2020-01-07 NOTE — NURSING TRANSFER
Nursing Transfer Note      1/7/2020     Transfer To: CT    Transfer via wheelchair    Transfer with cardiac monitoring    Transported by transporter    Medicines sent: no    Chart send with patient: Yes

## 2020-01-07 NOTE — PLAN OF CARE
Patient downgraded to OBS  Called Echo Lab to escalate testing to be done ASAP  Possible d/c today pending echo results

## 2020-01-07 NOTE — SUBJECTIVE & OBJECTIVE
No current facility-administered medications on file prior to encounter.      Current Outpatient Medications on File Prior to Encounter   Medication Sig    ALPRAZolam (XANAX) 1 MG tablet Take 1 mg by mouth Daily.    aspirin (ECOTRIN) 81 MG EC tablet Take 81 mg by mouth once daily.    atorvastatin (LIPITOR) 40 MG tablet Take 40 mg by mouth every evening.    augmented betamethasone dipropionate (DIPROLENE-AF) 0.05 % cream Apply topically 2 (two) times daily.    bromelains 500 mg Tab Take by mouth.    buPROPion (WELLBUTRIN XL) 150 MG TB24 tablet Take 150 mg by mouth once daily.    carvedilol (COREG) 12.5 MG tablet Take 12.5 mg by mouth 2 (two) times daily.    citalopram (CELEXA) 40 MG tablet Take 40 mg by mouth once daily.    clopidogrel (PLAVIX) 75 mg tablet Take 75 mg by mouth once daily.    co-enzyme Q-10 30 mg capsule Take 30 mg by mouth 3 (three) times daily.    ENTRESTO 24-26 mg per tablet Take 1 tablet by mouth 2 (two) times daily.    furosemide (LASIX) 40 MG tablet Take 40 mg by mouth 2 (two) times daily.    omega-3 fatty acids/fish oil (FISH OIL-OMEGA-3 FATTY ACIDS) 300-1,000 mg capsule Take by mouth once daily.    potassium chloride SA (K-DUR,KLOR-CON) 20 MEQ tablet Take 20 mEq by mouth once daily.       Review of patient's allergies indicates:   Allergen Reactions    Penicillins Rash    Vancomycin Rash    Phenergan [promethazine]      Restless leg syndrome    Diphenhydramine hcl Other (See Comments)       Past Medical History:   Diagnosis Date    Cancer     breast    Cardiomyopathy     CHF (congestive heart failure)     Hyperlipidemia     Hypertension     MVP (mitral valve prolapse)      Past Surgical History:   Procedure Laterality Date     SECTION      CHOLECYSTECTOMY      HYSTERECTOMY      MASTECTOMY      TONSILLECTOMY       Family History     None        Tobacco Use    Smoking status: Never Smoker    Smokeless tobacco: Never Used   Substance and Sexual Activity     Alcohol use: Not on file    Drug use: Not on file    Sexual activity: Not on file     Review of Systems   Constitutional: Negative for activity change and fatigue.   Respiratory: Negative for cough and shortness of breath.    Cardiovascular: Negative for chest pain, palpitations and leg swelling.   Gastrointestinal: Negative for abdominal pain, nausea and vomiting.   Endocrine: Negative for polydipsia, polyphagia and polyuria.   Genitourinary: Negative for dysuria.   Musculoskeletal: Negative for gait problem.   Skin: Negative for rash.   Allergic/Immunologic: Negative for immunocompromised state.   Neurological: Negative for dizziness, syncope and weakness.   Hematological: Does not bruise/bleed easily.   Psychiatric/Behavioral: Negative for behavioral problems.     Objective:     Vital Signs (Most Recent):  Temp: 97.5 °F (36.4 °C) (01/07/20 0738)  Pulse: 60 (01/07/20 1000)  Resp: 16 (01/07/20 0738)  BP: (!) 96/58 (01/07/20 0738)  SpO2: 98 % (01/07/20 0738) Vital Signs (24h Range):  Temp:  [96.7 °F (35.9 °C)-98.8 °F (37.1 °C)] 97.5 °F (36.4 °C)  Pulse:  [60-80] 60  Resp:  [16-20] 16  SpO2:  [95 %-98 %] 98 %  BP: ()/(50-61) 96/58     Weight: 59.8 kg (131 lb 13.4 oz)  Body mass index is 24.91 kg/m².    SpO2: 98 %  O2 Device (Oxygen Therapy): room air     Intake/Output - Last 3 Shifts       01/05 0700 - 01/06 0659 01/06 0700 - 01/07 0659 01/07 0700 - 01/08 0659    P.O.  660 240    Total Intake(mL/kg)  660 (10.9) 240 (4)    Urine (mL/kg/hr)  1050     Total Output  1050     Net  -390 +240                  Lines/Drains/Airways     Peripheral Intravenous Line                 Peripheral IV - Single Lumen 01/06/20 0949 20 G Anterior;Left;Proximal Forearm 1 day              Physical Exam   Constitutional: She is oriented to person, place, and time. She appears well-developed and well-nourished.   HENT:   Head: Normocephalic.   Nose: Nose normal.   Eyes: EOM are normal.   Neck: Normal range of motion.    Cardiovascular: Normal rate, regular rhythm and normal heart sounds.   Pulmonary/Chest: Effort normal and breath sounds normal.   Abdominal: Soft.   Musculoskeletal: Normal range of motion.   Neurological: She is alert and oriented to person, place, and time.   Skin: Skin is warm, dry and intact.   Sternal Incision CDI   Psychiatric: She has a normal mood and affect.       Significant Labs:  BMP:   Recent Labs   Lab 01/06/20  1459 01/07/20  0414   GLU 83 91    142   K 3.8 4.5    105   CO2 25 27   BUN 14 15   CREATININE 0.9 1.0   CALCIUM 9.3 8.8   MG 2.0  --      CBC:   Recent Labs   Lab 01/06/20  1459   WBC 8.34   RBC 4.73   HGB 13.2   HCT 43.1   *   MCV 91   MCH 27.9   MCHC 30.6*       Significant Diagnostics:  ECHO:

## 2020-01-07 NOTE — PT/OT/SLP EVAL
"Physical Therapy Evaluation and Discharge Note    Patient Name:  Trixie Toth   MRN:  [id number]    Recommendations:     Discharge Recommendations:  home   Discharge Equipment Recommendations: none   Barriers to discharge: None    Assessment:     Trixie Toth is a 59 y.o. female admitted with a medical diagnosis of CHF (congestive heart failure). Pt completing functional mobility without physical assist or use of DME. Ambulated community distance without LOB, SOB, or significant gait deviations noted.  At this time, patient is functioning at their prior level of function and does not require further acute PT services.     Recent Surgery: * No surgery found *      Plan:     During this hospitalization, patient does not require further acute PT services.  Please re-consult if situation changes.      Subjective     Chief Complaint: none noted   Patient/Family Comments/goals: "I was hoping I could get a transplant but it's looking like the LVAD."  Pain/Comfort:  · Pain Rating 1: 0/10    Patients cultural, spiritual, Yazidi conflicts given the current situation: no    Living Environment:  Pt lives alone in a 1SH with 0 [place].   Prior to admission, patients level of function was independent, including driving.  Equipment used at home: none.  DME owned (not currently used): none.  Upon discharge, patient will have assistance from family and friends.    Objective:     Communicated with RN prior to session.  Patient found seated on bedside couch with telemetry upon PT entry to room.    General Precautions: Standard     Orthopedic Precautions:N/A   Braces: N/A     Exams:  · Cognitive Exam:  Patient is oriented to Person, Place, Time and Situation  · RLE ROM: WFL  · RLE Strength: WFL  · LLE ROM: WFL  · LLE Strength: WFL    Functional Mobility:  · Transfers:     · Sit to Stand:  independence with no AD, x2 reps from bedside couch   · Able to complete without use of UE  · Gait: ~600 ft. with independence and no AD  · Decreased " attention to R with pt intermittently bumping into walls/objects on R  · No LOB, SOB, or otherwise significant gait deviations noted    AM-PAC 6 CLICK MOBILITY  Total Score:24       Therapeutic Activities and Exercises:   Pt educated on role of PT and PT POC, including plan to d/c IP PT services at this time. Pt verbalized understanding.   Pt educated on sternal precautions that will be in place if LVAD or OHT occur. Pt verbalized understanding and states that she is familiar with precautions from previous open heart surgery. Pt demo'd ability to complete transfer without use of UE. Pt encouraged to continue practicing bed mobility and transfers without use of UE in preparation for potential upcoming surgery. Pt v/u.     AM-PAC 6 CLICK MOBILITY  Total Score:24     Patient left seated on bedside couch with all lines intact and call button in reach.    GOALS:   Multidisciplinary Problems     Physical Therapy Goals     Not on file          Multidisciplinary Problems (Resolved)        Problem: Physical Therapy Goal    Goal Priority Disciplines Outcome Goal Variances Interventions   Physical Therapy Goal   (Resolved)     PT, PT/OT Met                     History:     Past Medical History:   Diagnosis Date    Cancer     breast    Cardiomyopathy     CHF (congestive heart failure)     Hyperlipidemia     Hypertension     MVP (mitral valve prolapse)        Past Surgical History:   Procedure Laterality Date     SECTION      CHOLECYSTECTOMY      HYSTERECTOMY      MASTECTOMY      TONSILLECTOMY         Time Tracking:     PT Received On: 20  PT Start Time: 1304     PT Stop Time: 1320  PT Total Time (min): 16 min     Billable Minutes: Evaluation 16  (co-eval with OT)    Danae Whelan, PT, DPT   2020  [phone]

## 2020-01-07 NOTE — PLAN OF CARE
Updated care plan w/ pt.  Address all needs throughout shift.  Pt is here for diuresis and possible inotropic agent.  No falls during shift. Pt reposition herself in the bed independently.

## 2020-01-07 NOTE — PLAN OF CARE
Problem: Occupational Therapy Goal  Goal: Occupational Therapy Goal  Description  Skilled OT services not necessary at this time secondary to patient performing ADLs at [facility]. Patient in agreement. Please re-consult OT if change in patient's functional status is noted.   Outcome: Met Haydee Hernandez OTR/L  Pager #: [phone]  1/7/2020

## 2020-01-07 NOTE — DISCHARGE SUMMARY
[facility]  Heart Transplant  Discharge Summary      Patient Name: Trixie Toth  MRN: [id number]  Admission Date: 1/6/2020  Hospital Length of Stay: 1 days  Discharge Date and Time: 01/07/2020 1:11 PM  Attending Physician: Louie Jeff MD   Discharging Provider: Miguel Pierce MD  Primary Care Provider: Toan Peterson MD     HPI: 59 y.o. year old White female with PMH of ICM wit EF 20-25%, breast cancer S/P masectomy, chemotherapy ,Appendix carcinoma s/p colectomy presented from Clinic for work up and diuresis. Patient was seen by Dr Canales in clinic where she was complaining of orthpnea, sob on exertion but patient endoreses she has 1 pillow orthopnea which didnt worse and she was able to walk one block and clinmb upstoars with out SOB. She denies lack of appetite, nausea, vomiting, LE edema. She endorses of having chest fullness and abd bloating sensation      * No surgery found *     Hospital Course: Patient was admitted and she was started pathway for Advance heart failure options but given insurance reason, she will get work up as outpatient for OHT  but before that she will get RHC and CPX as outpatient. Patient was asymptomatic during her stay. Echo showed EF of 10% and LVedD 5.29. CTS saw the patient while inpatient and recommend not a candidate for MCS.  CT-scan of the head no acute intra cranial Abnl and CT chest showed 0.3CM micro nodule.     Consults (From admission, onward)        Status Ordering Provider     Inpatient consult to Cardiothoracic Surgery  Once     Provider:  (Not yet assigned)    Acknowledged CHANTE RG          Significant Diagnostic Studies: Labs:   BMP:   Recent Labs   Lab 01/06/20  1459 01/07/20  0414   GLU 83 91    142   K 3.8 4.5    105   CO2 25 27   BUN 14 15   CREATININE 0.9 1.0   CALCIUM 9.3 8.8   MG 2.0  --        Pending Diagnostic Studies:     Procedure Component Value Units Date/Time    CT CHEST ABDOMEN PELVIS WITHOUT  CONTRAST(XPD) [[id number]]     Order Status:  Sent Lab Status:  No result     CT Head Without Contrast [[id number]]     Order Status:  Sent Lab Status:  No result     Echo [[id number]]     Order Status:  Sent Lab Status:  No result         Final Active Diagnoses:    Diagnosis Date Noted POA    PRINCIPAL PROBLEM:  CHF (congestive heart failure) [I50.9]  Yes    Heart failure [I50.9] 01/06/2020 Yes    Coronary artery disease involving coronary bypass graft of native heart [I25.810] 12/09/2019 Yes    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     aspirin 81 MG EC tablet  Commonly known as:  ECOTRIN  Take 81 mg by mouth once daily.     atorvastatin 40 MG tablet  Commonly known as:  LIPITOR  Take 40 mg by mouth every evening.     augmented betamethasone dipropionate 0.05 % cream  Commonly known as:  DIPROLENE-AF  Apply topically 2 (two) times daily.     bromelains 500 mg Tab  Take by mouth.     buPROPion 150 MG TB24 tablet  Commonly known as:  WELLBUTRIN XL  Take 150 mg by mouth once daily.     carvedilol 12.5 MG tablet  Commonly known as:  COREG  Take 12.5 mg by mouth 2 (two) times daily.     citalopram 40 MG tablet  Commonly known as:  CELEXA  Take 40 mg by mouth once daily.     clopidogrel 75 mg tablet  Commonly known as:  PLAVIX  Take 75 mg by mouth once daily.     co-enzyme Q-10 30 mg capsule  Take 30 mg by mouth 3 (three) times daily.     Entresto 24-26 mg per tablet  Generic drug:  sacubitril-valsartan  Take 1 tablet by mouth 2 (two) times daily.      fish oil-omega-3 fatty acids 300-1,000 mg capsule  Take by mouth once daily.     furosemide 40 MG tablet  Commonly known as:  LASIX  Take 40 mg by mouth 2 (two) times daily.     potassium chloride SA 20 MEQ tablet  Commonly known as:  K-DURKLOR-CON  Take 20 mEq by mouth once daily.            Miguel Pierce MD  Heart Transplant  [facility]

## 2020-01-07 NOTE — PROGRESS NOTES
"   01/06/20 5779   Vital Signs   BP (!) 80/50   MAP (mmHg) 60     MD Domingo made of pt BP above. Pt is asymptomatic and states "I feel good." Pt has schedule that was entresto given when BP was 95/56 MAP 75, coreg was not given at that time.  MD stated to continue to monitor pt. Will continue to monitor  "

## 2020-01-07 NOTE — PROGRESS NOTES
Patient is ready for discharge. Patient stable alert and oriented. IVs removed. No complaints of pain. Discussed discharge plan. Reviewed medications and side effects, appointments, and answered questions with patient and family. No prescriptions given to patient.

## 2020-01-07 NOTE — HOSPITAL COURSE
Patient was admitted and she was started pathway for Advance heart failure options but given insurance reason and she being in obs , work up will be done as outpatient. CTS saw the patient while inpatient      CT-scan of the abdomen head no acute intra cranial Abnl and CT chest showed 0.3CM micro nodule.

## 2020-01-07 NOTE — PLAN OF CARE
Discharge Planning:  Patient ready for discharge today in oBS status  Dr. Kolb met with patient  Orders are in for Discharge    [facility] order placed for transportation pickup, requested for 1:30pm    Please call [phone] for any transportation delays or concerns     01/07    1301   Transfer Order Received        Ordered By: Shandra Gutiérrez RN   Callback: Patience Mesa   Request created via order by Shandra Gutiérrez RN on behalf of Louie Jeff MD  Can patient transfer independently in and out of chair?: Yes  Contact Number: Patience [id number]  Destination name/address: [address] [place] [address]  Has restraints: No  Hospital Area: OTHER  Is family at home to assist?: N/A  Isolation needed: No  Patient's Current Location: 348  Request Type: Non-Emergent Facilitated Transportation  Requested  Time: 1:30 PM  Requested Pickup Date: 1/7/2020  Transport equipment: Wheelchair  Type of Transport: Ground Routine  Weight (kg): 59.8     Patient admitted to [facility] in room 348/348 A.                 Request Began   Shandar Gutiérrez RN      Selected OTHER as transfer destination

## 2020-01-07 NOTE — PLAN OF CARE
Plan of care discussed with patient. Patient is free of fall/trauma/injury. Denies CP, SOB, or pain/discomfort. Patient asked about fluid restriction and how much fluid she could drink. Educated patient on fluid restriction. All questions addressed. Will continue to monitor

## 2020-01-29 ENCOUNTER — TELEPHONE (OUTPATIENT)
Dept: TRANSPLANT | Facility: CLINIC | Age: 60
End: 2020-01-29

## 2020-01-29 NOTE — TELEPHONE ENCOUNTER
Call returned in response to voice mail message left today.  Pt advised that she has felt a little more fatigued this week than usual.  She reports no new medication, diet or activity changes.  Reviewed recent echo; EF of 10% is noted and reviewed with patient.  Advised that this could account for fatigue.  Pt is scheduled for RHC and CPX on 2/10/20; confirmed with pt.  No blood thinners noted on chart.  Will review results and determine need for full evaluation with provider at that time as she will see Dr. Canales on same date.  Pt voiced understanding.

## 2020-02-04 ENCOUNTER — TELEPHONE (OUTPATIENT)
Dept: TRANSPLANT | Facility: CLINIC | Age: 60
End: 2020-02-04

## 2020-02-04 NOTE — LETTER
[facility]  [address]  [facility] [address]  Phone: [phone]       20      Re:  Trixie Toth (:  56)      To whom it may concern:    Please be advised that patient is under consideration for advanced heart failure options (heart transplant and left ventricular assist device implantation).  It is with patient's permission and at her request that this information is provided.    Sincerely,      Nicol Domínguez RN, [facility]  Heart Transplant Coordinator  [facility]

## 2020-02-07 ENCOUNTER — TELEPHONE (OUTPATIENT)
Dept: TRANSPLANT | Facility: CLINIC | Age: 60
End: 2020-02-07

## 2020-02-07 NOTE — TELEPHONE ENCOUNTER
Received notification that pt has cancelled her risk stratification appointments for Monday.  She advised that she is strongly considering moving to [place] as she has someone that she would be able to live with who could care for her in the event she receive an LVAD or heart transplant.  Urged pt to complete risk stratification to determine if those needs are realistic; however, she prefers to cancel at this time.  Advised to contact me should she change her mind; she reports having my contact information and verbalized understanding.  Will f/u in a few weeks to determine if referral should be closed.

## 2020-02-17 ENCOUNTER — TELEPHONE (OUTPATIENT)
Dept: TRANSPLANT | Facility: CLINIC | Age: 60
End: 2020-02-17

## 2020-02-17 NOTE — TELEPHONE ENCOUNTER
Called to f/u plan with patient.  She reports that she will most likely be moving to [place] and plan to f/u there for possible heart transplant.  She advised that there is still a slight chance that she could return here for AHF f/u.  She advised that she will notify me when her final decision has been made.  Verified that she has my contact information.

## 2020-04-21 ENCOUNTER — TELEPHONE (OUTPATIENT)
Dept: TRANSPLANT | Facility: CLINIC | Age: 60
End: 2020-04-21

## 2020-04-21 NOTE — TELEPHONE ENCOUNTER
Called patient to see if plan to continue care with [facility]. No answer. Unable to leave message as voicemail not set up.

## 2020-07-09 LAB — CRC RECOMMENDATION EXT: NORMAL

## 2020-08-05 ENCOUNTER — DOCUMENTATION ONLY (OUTPATIENT)
Dept: TRANSPLANT | Facility: CLINIC | Age: 60
End: 2020-08-05

## 2020-08-05 NOTE — PROGRESS NOTES
Per chart review pt was likely moving to [place].  No response to calls placed.     preht file closed after review with Dr Jeff .

## 2023-04-21 ENCOUNTER — OFFICE VISIT (OUTPATIENT)
Dept: URGENT CARE | Facility: CLINIC | Age: 63
End: 2023-04-21
Payer: MEDICARE

## 2023-04-21 VITALS
HEIGHT: 61 IN | WEIGHT: 145 LBS | DIASTOLIC BLOOD PRESSURE: 78 MMHG | RESPIRATION RATE: 18 BRPM | TEMPERATURE: 99 F | SYSTOLIC BLOOD PRESSURE: 126 MMHG | OXYGEN SATURATION: 99 % | BODY MASS INDEX: 27.38 KG/M2

## 2023-04-21 DIAGNOSIS — R30.0 DYSURIA: Primary | ICD-10-CM

## 2023-04-21 LAB
BILIRUB UR QL STRIP: POSITIVE
GLUCOSE UR QL STRIP: NEGATIVE
KETONES UR QL STRIP: NEGATIVE
LEUKOCYTE ESTERASE UR QL STRIP: NEGATIVE
PH, POC UA: 7
POC BLOOD, URINE: NEGATIVE
POC NITRATES, URINE: POSITIVE
PROT UR QL STRIP: NEGATIVE
SP GR UR STRIP: 1 (ref 1–1.03)
UROBILINOGEN UR STRIP-ACNC: ABNORMAL (ref 0.1–1.1)

## 2023-04-21 PROCEDURE — 81003 URINALYSIS AUTO W/O SCOPE: CPT | Mod: QW,,, | Performed by: NURSE PRACTITIONER

## 2023-04-21 PROCEDURE — 87088 URINE BACTERIA CULTURE: CPT | Performed by: NURSE PRACTITIONER

## 2023-04-21 PROCEDURE — 99203 OFFICE O/P NEW LOW 30 MIN: CPT | Mod: ,,, | Performed by: NURSE PRACTITIONER

## 2023-04-21 PROCEDURE — 99203 PR OFFICE/OUTPT VISIT, NEW, LEVL III, 30-44 MIN: ICD-10-PCS | Mod: ,,, | Performed by: NURSE PRACTITIONER

## 2023-04-21 PROCEDURE — 81003 POCT URINALYSIS, DIPSTICK, AUTOMATED, W/O SCOPE: ICD-10-PCS | Mod: QW,,, | Performed by: NURSE PRACTITIONER

## 2023-04-21 RX ORDER — MYCOPHENOLATE MOFETIL 500 MG/1
1000 TABLET ORAL 2 TIMES DAILY
COMMUNITY
Start: 2023-02-24

## 2023-04-21 RX ORDER — TACROLIMUS 1 MG/1
CAPSULE ORAL
COMMUNITY
Start: 2023-02-24 | End: 2024-02-24

## 2023-04-21 RX ORDER — LOSARTAN POTASSIUM 25 MG/1
25 TABLET ORAL
COMMUNITY
Start: 2023-02-24 | End: 2023-10-24

## 2023-04-21 RX ORDER — PREDNISONE 5 MG/1
5 TABLET ORAL
COMMUNITY
Start: 2023-02-24

## 2023-04-21 RX ORDER — CLONAZEPAM 0.5 MG/1
0.5 TABLET ORAL NIGHTLY PRN
COMMUNITY
Start: 2023-03-29

## 2023-04-21 RX ORDER — PANTOPRAZOLE SODIUM 40 MG/1
40 TABLET, DELAYED RELEASE ORAL
COMMUNITY
Start: 2023-02-24 | End: 2024-02-24

## 2023-04-21 RX ORDER — FERROUS SULFATE 325(65) MG
325 TABLET ORAL DAILY
COMMUNITY

## 2023-04-21 RX ORDER — LANOLIN ALCOHOL/MO/W.PET/CERES
800 CREAM (GRAM) TOPICAL 3 TIMES DAILY
COMMUNITY

## 2023-04-21 RX ORDER — CIPROFLOXACIN 500 MG/1
500 TABLET ORAL EVERY 12 HOURS
Qty: 20 TABLET | Refills: 0 | Status: SHIPPED | OUTPATIENT
Start: 2023-04-21 | End: 2023-05-01

## 2023-04-21 RX ORDER — PRAVASTATIN SODIUM 40 MG/1
40 TABLET ORAL NIGHTLY
COMMUNITY
Start: 2023-02-24 | End: 2023-10-24

## 2023-04-21 NOTE — PROGRESS NOTES
"Subjective:      Patient ID: Trixie Toth is a 63 y.o. female.    Vitals:  height is 5' 1" (1.549 m) and weight is 65.8 kg (145 lb). Her oral temperature is 98.8 °F (37.1 °C). Her blood pressure is 126/78. Her respiration is 18 and oxygen saturation is 99%.     Chief Complaint: Dysuria (Dysuria, urinary frequency x 3 days. Taking AZO OTC. )    63-year-old female presents with urinary frequency and urgency.  Mild dysuria without abdominal, flank pain or fever.  Onset 3 days ago.  Currently taking a Zio.  Heart transplant patient states she called her transplant team and they recommended for symptoms okay to take Cipro    Genitourinary:  Positive for dysuria, frequency and urgency. Negative for flank pain, bladder incontinence and hematuria.    Objective:     Physical Exam   Constitutional: She is oriented to person, place, and time. She appears well-developed.   HENT:   Head: Normocephalic and atraumatic.   Ears:   Right Ear: External ear normal.   Left Ear: External ear normal.   Nose: Nose normal.   Mouth/Throat: Mucous membranes are normal.   Eyes: Conjunctivae and lids are normal.   Neck: Trachea normal. Neck supple.   Cardiovascular: Normal rate, regular rhythm and normal heart sounds.   Pulmonary/Chest: Effort normal and breath sounds normal. No respiratory distress.   Abdominal: Normal appearance and bowel sounds are normal. She exhibits no distension and no mass. Soft. There is no abdominal tenderness.   Musculoskeletal: Normal range of motion.         General: Normal range of motion.   Neurological: She is alert and oriented to person, place, and time. She has normal strength.   Skin: Skin is warm, dry, intact, not diaphoretic and not pale.   Psychiatric: Her speech is normal and behavior is normal. Judgment and thought content normal.   Nursing note and vitals reviewed.    Assessment:     1. Dysuria      Office Visit on 04/21/2023   Component Date Value Ref Range Status    POC Blood, Urine 04/21/2023 " Negative  Negative Final    POC Bilirubin, Urine 04/21/2023 Positive (A)  Negative Final    2 mg/dl    POC Urobilinogen, Urine 04/21/2023 8 mg/dl  0.1 - 1.1 Final    POC Ketones, Urine 04/21/2023 Negative  Negative Final    POC Protein, Urine 04/21/2023 Negative  Negative Final    POC Nitrates, Urine 04/21/2023 Positive (A)  Negative Final    POC Glucose, Urine 04/21/2023 Negative  Negative Final    pH, UA 04/21/2023 7   Final    POC Specific Gravity, Urine 04/21/2023 1.005  1.003 - 1.029 Final    POC Leukocytes, Urine 04/21/2023 Negative  Negative Final      Plan:   Maintain adequate hydration.    Monitor symptoms closely.    Watch out for worsening urinary symptoms, blood in the urine, flank pain, fever, chills, myalgias, and vomiting.    Seek further medical attention immediately at the 1st sign.    Follow-up with your PCP within 72 hours.    We will call you with urine culture results within the next 24-48 hours.   Communicate via telephone with your  transplant team for instruction, will prescribe Cipro and do urine culture.  Dysuria  -     POCT Urinalysis, Dipstick, Automated, W/O Scope  -     Urine culture  -     ciprofloxacin HCl (CIPRO) 500 MG tablet; Take 1 tablet (500 mg total) by mouth every 12 (twelve) hours. for 10 days  Dispense: 20 tablet; Refill: 0

## 2023-04-21 NOTE — PATIENT INSTRUCTIONS
Maintain adequate hydration.    Monitor symptoms closely.    Watch out for worsening urinary symptoms, blood in the urine, flank pain, fever, chills, myalgias, and vomiting.    Seek further medical attention immediately at the 1st sign.    Follow-up with your PCP within 72 hours.    We will call you with urine culture results within the next 24-48 hours.   Communicate via telephone with your  transplant team for instruction, will prescribe Cipro and do urine culture.

## 2023-04-23 LAB — BACTERIA UR CULT: NO GROWTH

## 2023-05-02 PROBLEM — I50.42 CHRONIC COMBINED SYSTOLIC AND DIASTOLIC HEART FAILURE: Status: ACTIVE | Noted: 2020-01-06

## 2023-05-02 PROBLEM — I10 ESSENTIAL HYPERTENSION: Status: ACTIVE | Noted: 2021-12-23

## 2023-05-02 PROBLEM — R30.0 DYSURIA: Status: RESOLVED | Noted: 2023-04-21 | Resolved: 2023-05-02

## 2023-05-02 PROBLEM — Z87.81 HISTORY OF VERTEBRAL FRACTURE: Status: ACTIVE | Noted: 2022-06-14

## 2023-05-02 PROBLEM — R91.1 INCIDENTAL LUNG NODULE, LESS THAN OR EQUAL TO 3MM: Status: ACTIVE | Noted: 2020-06-24

## 2023-05-02 PROBLEM — D3A.020 CARCINOID TUMOR OF APPENDIX: Status: ACTIVE | Noted: 2020-04-15

## 2023-05-02 PROBLEM — D69.6 THROMBOCYTOPENIA: Status: ACTIVE | Noted: 2023-05-02

## 2023-05-02 PROBLEM — Z94.1 HEART TRANSPLANT RECIPIENT: Status: ACTIVE | Noted: 2023-05-02

## 2023-05-02 PROBLEM — Z85.3 HISTORY OF BREAST CANCER: Status: ACTIVE | Noted: 2020-02-24

## 2023-05-02 PROBLEM — N17.9 AKI (ACUTE KIDNEY INJURY): Status: ACTIVE | Noted: 2021-12-24

## 2023-05-02 PROBLEM — Z85.9 PERSONAL HISTORY OF MALIGNANT CARCINOID TUMOR: Status: ACTIVE | Noted: 2023-05-02

## 2023-05-02 PROBLEM — H47.399 CUP TO DISC ASYMMETRY: Status: ACTIVE | Noted: 2021-04-02

## 2023-05-02 PROBLEM — Z79.899 IMMUNOSUPPRESSION DUE TO DRUG THERAPY: Status: ACTIVE | Noted: 2022-04-08

## 2023-05-02 PROBLEM — E21.0 NORMOCALCEMIC PRIMARY HYPERPARATHYROIDISM: Status: ACTIVE | Noted: 2022-06-14

## 2023-05-02 PROBLEM — M19.90 ARTHRITIS: Status: ACTIVE | Noted: 2023-05-02

## 2023-05-02 PROBLEM — T86.21: Status: ACTIVE | Noted: 2021-11-03

## 2023-05-02 PROBLEM — I50.43 ACUTE ON CHRONIC COMBINED SYSTOLIC AND DIASTOLIC CONGESTIVE HEART FAILURE: Status: ACTIVE | Noted: 2020-02-11

## 2023-05-02 PROBLEM — K57.90 DIVERTICULOSIS: Status: ACTIVE | Noted: 2020-02-24

## 2023-05-02 PROBLEM — E03.9 ACQUIRED HYPOTHYROIDISM: Status: ACTIVE | Noted: 2022-09-21

## 2023-05-02 PROBLEM — D80.1 HYPOGAMMAGLOBULINEMIA: Status: ACTIVE | Noted: 2022-10-28

## 2023-05-02 PROBLEM — D84.821 IMMUNOSUPPRESSION DUE TO DRUG THERAPY: Status: ACTIVE | Noted: 2022-04-08

## 2023-05-02 PROBLEM — G43.909 MIGRAINE HEADACHE: Status: ACTIVE | Noted: 2023-05-02

## 2023-05-02 PROBLEM — F41.9 ANXIETY: Status: ACTIVE | Noted: 2020-02-24

## 2023-05-02 PROBLEM — T86.90: Status: ACTIVE | Noted: 2022-04-07

## 2023-05-02 PROBLEM — Z86.69 HISTORY OF MIGRAINE: Status: ACTIVE | Noted: 2023-05-02

## 2023-05-02 PROBLEM — F32.A DEPRESSIVE DISORDER: Status: ACTIVE | Noted: 2020-02-24

## 2023-07-04 DIAGNOSIS — R30.0 DYSURIA: ICD-10-CM

## 2023-07-10 RX ORDER — CIPROFLOXACIN 500 MG/1
500 TABLET ORAL EVERY 12 HOURS
Qty: 20 TABLET | Refills: 0 | OUTPATIENT
Start: 2023-07-10 | End: 2023-07-20

## 2023-07-18 ENCOUNTER — TELEPHONE (OUTPATIENT)
Dept: FAMILY MEDICINE | Facility: CLINIC | Age: 63
End: 2023-07-18

## 2023-07-18 ENCOUNTER — OFFICE VISIT (OUTPATIENT)
Dept: FAMILY MEDICINE | Facility: CLINIC | Age: 63
End: 2023-07-18
Payer: MEDICARE

## 2023-07-18 VITALS
BODY MASS INDEX: 27.75 KG/M2 | TEMPERATURE: 99 F | HEART RATE: 108 BPM | SYSTOLIC BLOOD PRESSURE: 119 MMHG | OXYGEN SATURATION: 98 % | HEIGHT: 61 IN | WEIGHT: 147 LBS | RESPIRATION RATE: 20 BRPM | DIASTOLIC BLOOD PRESSURE: 85 MMHG

## 2023-07-18 DIAGNOSIS — C50.912: ICD-10-CM

## 2023-07-18 DIAGNOSIS — B07.0 PLANTAR WART OF RIGHT FOOT: Primary | ICD-10-CM

## 2023-07-18 DIAGNOSIS — Z85.3 HISTORY OF BREAST CANCER: ICD-10-CM

## 2023-07-18 DIAGNOSIS — Z79.899 IMMUNOSUPPRESSION DUE TO DRUG THERAPY: Chronic | ICD-10-CM

## 2023-07-18 DIAGNOSIS — I21.A9 OTHER MYOCARDIAL INFARCTION TYPE: ICD-10-CM

## 2023-07-18 DIAGNOSIS — Z91.89 PNEUMOCOCCAL VACCINATION INDICATED: ICD-10-CM

## 2023-07-18 DIAGNOSIS — D84.821 IMMUNOSUPPRESSION DUE TO DRUG THERAPY: Chronic | ICD-10-CM

## 2023-07-18 DIAGNOSIS — C78.89: ICD-10-CM

## 2023-07-18 DIAGNOSIS — E03.9 ACQUIRED HYPOTHYROIDISM: Chronic | ICD-10-CM

## 2023-07-18 DIAGNOSIS — Z12.31 ENCOUNTER FOR SCREENING MAMMOGRAM FOR BREAST CANCER: ICD-10-CM

## 2023-07-18 DIAGNOSIS — G47.19 EXCESSIVE DAYTIME SLEEPINESS: ICD-10-CM

## 2023-07-18 DIAGNOSIS — I10 ESSENTIAL HYPERTENSION: Chronic | ICD-10-CM

## 2023-07-18 DIAGNOSIS — Z85.9 PERSONAL HISTORY OF MALIGNANT CARCINOID TUMOR: ICD-10-CM

## 2023-07-18 DIAGNOSIS — I50.42 CHRONIC COMBINED SYSTOLIC AND DIASTOLIC HEART FAILURE: Chronic | ICD-10-CM

## 2023-07-18 DIAGNOSIS — E78.2 MIXED HYPERLIPIDEMIA: ICD-10-CM

## 2023-07-18 DIAGNOSIS — E21.0 NORMOCALCEMIC PRIMARY HYPERPARATHYROIDISM: Chronic | ICD-10-CM

## 2023-07-18 DIAGNOSIS — I50.43 ACUTE ON CHRONIC COMBINED SYSTOLIC AND DIASTOLIC CONGESTIVE HEART FAILURE: ICD-10-CM

## 2023-07-18 DIAGNOSIS — Z11.59 NEED FOR HEPATITIS C SCREENING TEST: ICD-10-CM

## 2023-07-18 DIAGNOSIS — Z13.1 SCREENING FOR DIABETES MELLITUS: ICD-10-CM

## 2023-07-18 DIAGNOSIS — Z11.4 ENCOUNTER FOR SCREENING FOR HIV: ICD-10-CM

## 2023-07-18 PROBLEM — F33.9 MAJOR DEPRESSION, RECURRENT, CHRONIC: Status: ACTIVE | Noted: 2020-02-24

## 2023-07-18 PROBLEM — D69.6 THROMBOCYTOPENIA: Chronic | Status: ACTIVE | Noted: 2023-05-02

## 2023-07-18 PROBLEM — I25.810 CORONARY ARTERY DISEASE INVOLVING CORONARY BYPASS GRAFT OF NATIVE HEART: Chronic | Status: ACTIVE | Noted: 2019-12-09

## 2023-07-18 PROBLEM — Z94.1 HEART TRANSPLANT RECIPIENT: Chronic | Status: ACTIVE | Noted: 2023-05-02

## 2023-07-18 PROBLEM — M80.00XA OSTEOPOROSIS WITH PATHOLOGICAL FRACTURE: Chronic | Status: ACTIVE | Noted: 2022-01-24

## 2023-07-18 PROBLEM — M80.00XA OSTEOPOROSIS WITH PATHOLOGICAL FRACTURE: Status: ACTIVE | Noted: 2022-01-24

## 2023-07-18 PROBLEM — D69.6 THROMBOCYTOPENIA: Chronic | Status: RESOLVED | Noted: 2023-05-02 | Resolved: 2023-07-18

## 2023-07-18 PROBLEM — D3A.020 CARCINOID TUMOR OF APPENDIX: Status: RESOLVED | Noted: 2020-04-15 | Resolved: 2023-07-18

## 2023-07-18 PROBLEM — T86.21: Chronic | Status: ACTIVE | Noted: 2021-11-03

## 2023-07-18 PROBLEM — F41.9 ANXIETY: Chronic | Status: ACTIVE | Noted: 2020-02-24

## 2023-07-18 PROCEDURE — G0009 ADMIN PNEUMOCOCCAL VACCINE: HCPCS | Mod: ,,, | Performed by: FAMILY MEDICINE

## 2023-07-18 PROCEDURE — 90677 PNEUMOCOCCAL CONJUGATE VACCINE 20-VALENT: ICD-10-PCS | Mod: ,,, | Performed by: FAMILY MEDICINE

## 2023-07-18 PROCEDURE — 99204 OFFICE O/P NEW MOD 45 MIN: CPT | Mod: 25,,, | Performed by: FAMILY MEDICINE

## 2023-07-18 PROCEDURE — G0009 PNEUMOCOCCAL CONJUGATE VACCINE 20-VALENT: ICD-10-PCS | Mod: ,,, | Performed by: FAMILY MEDICINE

## 2023-07-18 PROCEDURE — 17000 DESTRUCT PREMALG LESION: CPT | Mod: ,,, | Performed by: FAMILY MEDICINE

## 2023-07-18 PROCEDURE — 99204 PR OFFICE/OUTPT VISIT, NEW, LEVL IV, 45-59 MIN: ICD-10-PCS | Mod: 25,,, | Performed by: FAMILY MEDICINE

## 2023-07-18 PROCEDURE — 90677 PCV20 VACCINE IM: CPT | Mod: ,,, | Performed by: FAMILY MEDICINE

## 2023-07-18 PROCEDURE — 17000 PR DESTRUCTION(LASER SURGERY,CRYOSURGERY,CHEMOSURGERY),PREMALIGNANT LESIONS,FIRST LESION: ICD-10-PCS | Mod: ,,, | Performed by: FAMILY MEDICINE

## 2023-07-18 RX ORDER — CHOLECALCIFEROL (VITAMIN D3) 125 MCG
5000 CAPSULE ORAL DAILY
COMMUNITY
Start: 2023-06-28

## 2023-07-18 RX ORDER — CITALOPRAM 20 MG/1
20 TABLET, FILM COATED ORAL EVERY MORNING
COMMUNITY
Start: 2023-05-25

## 2023-07-18 RX ORDER — POTASSIUM PHOSPHATE, MONOBASIC 500 MG/1
500 TABLET, SOLUBLE ORAL 2 TIMES DAILY
COMMUNITY
Start: 2023-07-17 | End: 2024-07-16

## 2023-07-18 NOTE — PROGRESS NOTES
Trixie Toth  07/18/2023  [id number]    LAZARO LARIOS MD  Patient Care Team:  Lazaro Larios MD as PCP - General (Family Medicine)  Andrew Zhu MD as Consulting Physician (Cardiology)      Chief Complaint:  Chief Complaint   Patient presents with    Establish Care     Pt had heart transplant in 2021. States has hyperparathyroidism and would like a referral to Dr Kenny Herrera. PTH lab on 09/13/2022 was 168. C/O feeling so exhausted.       History of Present Illness:  HPI    63 y.o. female who presents today to establish care. Patient is a heart transplant recipient with numerous comorbidities such as HTN, HLD, Hyperaparathyroidism, hypothyroidism, migraines, CAD s/p CABG, history of breast cancer. She would like a referral to Dr. Herrera for hyperparathyroidism and hypothyroidism.    She also c/o plantar wart to right foot. She would like to try cryotherapy. Has tried and failed otc meds.     Also c/o feeling so exhausted that it is hard for her to move. Never had a sleep study.   EPWORTH SLEEPINESS SCALE 7/18/2023   Sitting and reading 3   Watching TV 1   Sitting, inactive in a public place (e.g. a theatre or a meeting) 2   As a passenger in a car for an hour without a break 3   Lying down to rest in the afternoon when circumstances permit 3   Sitting and talking to someone 0   Sitting quietly after a lunch without alcohol 0   In a car, while stopped for a few minutes in traffic 0   Total score 12         Review of Systems  General: denies f/c, weight loss, night sweats, decreased appetite  Eye: denies blurred vision, changes in vision  Respiratory: denies sob, wheezing, cough  Cardiovascular: denies chest pain, palpitations, edema  Gastrointestinal: denies abdominal pain, n/v, constipation, diarrhea  Integumentary: denies rashes, pruritis        Health Maintenance  Health Maintenance Topics with due status: Not Due       Topic Last Completion Date    Lipid Panel 11/15/2020    DEXA Scan  "11/02/2021    Influenza Vaccine 10/18/2022     Health Maintenance Due   Topic Date Due    Hepatitis C Screening  Never done    Pneumococcal Vaccines (Age 65+) (1 - PCV) Never done    HIV Screening  Never done    TETANUS VACCINE  Never done    Shingles Vaccine (1 of 2) Never done    Colorectal Cancer Screening  Never done    COVID-19 Vaccine (3 - Pfizer series) 02/13/2022    Hemoglobin A1c (Diabetic Prevention Screening)  02/24/2023       Exam:  Vitals:    07/18/23 1039   BP: 119/85   BP Location: Right arm   Patient Position: Sitting   BP Method: Large (Automatic)   Pulse: 108   Resp: 20   Temp: 98.7 °F (37.1 °C)   TempSrc: Oral   SpO2: 98%   Weight: 66.7 kg (147 lb)   Height: 5' 1" (1.549 m)     Weight: 66.7 kg (147 lb)   Body mass index is 27.78 kg/m².      Physical Exam  Constitutional: NAD, alert, pleasant  Respiratory: CTAB, no wheezes, rales or rhonchi. No accessory muscle use  Eyes: EOMI  Cardiovascular: RRR, No m/r/g. No JVD. No LE edema  Integumentary: warm, dry, intact. Plantar foot to right foot  Psych: AA&Ox3    Procedure note:     Cryotherapy to wart of right foot. Verbal consent obtained. Wound care instructions given. Patient tolerated the procedure well      ICD-10-CM ICD-9-CM   1. Plantar wart of right foot  B07.0 078.12   2. Normocalcemic primary hyperparathyroidism  E21.0 252.01   3. Immunosuppression due to drug therapy  D84.821 V58.69    Z79.899    4. Acquired hypothyroidism  E03.9 244.9   5. Essential hypertension  I10 401.9   6. 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 Acute on chronic combined systolic and diastolic congestive heart failure  I50.43 428.43     428.0   8. History of breast cancer  Z85.3 V10.3   9. Excessive daytime sleepiness  G47.19 780.54   10. Pneumococcal vaccination indicated  Z91.89 V49.89   11. Encounter for screening mammogram for breast cancer  Z12.31 V76.12   12. Chronic combined systolic and diastolic heart failure  I50.42 " 428.42   13. Personal history of malignant carcinoid tumor  Z85.9 V10.91       1. Plantar wart of right foot  Comments:  cryo to right foot. if no improvement will send to derm    2. Normocalcemic primary hyperparathyroidism  Overview:  Last pth 8/22 at 168. Normal scan in 12/22. See impression below:    IMPRESSION:       1.  Unable to localize a parathyroid adenoma.   2.  No evidence for multiplanar gland hyperplasia or ectopic parathyroid tissue.        Orders:  -     Ambulatory referral/consult to Endocrinology; Future; Expected date: 07/18/2023    3. Immunosuppression due to drug therapy  Overview:  Due to heart transplant in 2022. Will require long term immunosuppressive therapy      4. Acquired hypothyroidism  Overview:  tsh at goal on current dose of synthroid.     Assessment & Plan:  Continue current Rx meds      Orders:  -     Ambulatory referral/consult to Endocrinology; Future; Expected date: 07/18/2023    5. Essential hypertension  Overview:  At goal on current meds, asymptomatic    Assessment & Plan:  Continue current Rx meds        6. 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 Acute on chronic combined systolic and diastolic congestive heart failure    8. History of breast cancer  Overview:   Breast cancer (HCC) 10/2007   Mastectomy for T1cN0 [place] positive breast cancer. Adjuvant Taxotere/Cytoxan x 4 cycles, then AI x 3 yrs       Patient has a history of breast cancer but has not had a mmg in years. NO longer sees Dr. Zuniga but that is who she was seeing.       9. Excessive daytime sleepiness  -     Ambulatory referral/consult to Sleep Disorders; Future; Expected date: 07/18/2023    10. Pneumococcal vaccination indicated  -     Pneumococcal Conjugate Vaccine (20 Valent) (IM)    11. Encounter for screening mammogram for breast cancer  -     Mammo Digital Screening Bilat w/ Srikanth; Future; Expected date: 07/18/2023    12. Chronic combined systolic and diastolic heart  failure  Overview:  06/28/22 0R Neg 06/03/2022- Cardiac MRI   A. LV MILDLY DILATED WITH MODERATELY REDUCED SYSTOLIC FUNCTION (LVEF 42%). RV UPPER LIMITS OF NORMAL IN   SIZE WITH MILDLY REDUCED SYSTOLIC FUNCTION (RVEF 41%).   B. NO EVIDENCE OF MYOCARDIAL INFARCTION OR SCAR.   C. THICKENING AGAIN IS NOTED ALONG THE LEFT SUPERIOR INTER-ATRIAL SEPTUM. DIFFENTEIAL INCLUDES   POST-SURGICAL CHANGES, THROMBUS and MUCH LESS LIKELY MYXOMA. CONSIDER CARDIAC CT.   S/p heart transplant in 2022, on immunosuppressive drugs     Assessment & Plan:  At goal on current meds      13. 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      Care Plan/Goals: Reviewed   Goals    None

## 2023-07-20 ENCOUNTER — TELEPHONE (OUTPATIENT)
Dept: FAMILY MEDICINE | Facility: CLINIC | Age: 63
End: 2023-07-20
Payer: MEDICARE

## 2023-07-20 NOTE — TELEPHONE ENCOUNTER
Spoke to pt. She states that on the print out that she received from her office visit, the medication are not correct. She states that she knows she and I went through all of her meds and took off what she was not taking. She states that all of those medications are still on her med list. I asked pt to please being to me a current copy of her medications so that I could correct it again. She states that when she is in the area she will drop off a med list.

## 2023-07-20 NOTE — TELEPHONE ENCOUNTER
Called pt to find out what is wrong with her medication list. No answer and her VM mailbox is full

## 2023-07-20 NOTE — TELEPHONE ENCOUNTER
----- Message from Arabella Núñez sent at 7/18/2023  1:31 PM CDT -----  Regarding: Medication List  .Type:  Patient Returning Call    Who Called:Trixie  Who Left Message for Patient:Trixie  Does the patient know what this is regarding?:medication list  Would the patient rather a call back or a response via [facility]?   Best Call Back Number:[phone]  Additional Information: Patient states that the medication list is all wrong again

## 2023-07-21 ENCOUNTER — DOCUMENTATION ONLY (OUTPATIENT)
Dept: FAMILY MEDICINE | Facility: CLINIC | Age: 63
End: 2023-07-21
Payer: MEDICARE

## 2023-08-07 PROBLEM — N17.9 AKI (ACUTE KIDNEY INJURY): Status: RESOLVED | Noted: 2021-12-24 | Resolved: 2023-08-07

## 2023-08-23 ENCOUNTER — HOSPITAL ENCOUNTER (OUTPATIENT)
Dept: RADIOLOGY | Facility: HOSPITAL | Age: 63
Discharge: HOME | End: 2023-08-23
Attending: FAMILY MEDICINE
Payer: MEDICARE

## 2023-08-23 DIAGNOSIS — Z12.31 ENCOUNTER FOR SCREENING MAMMOGRAM FOR BREAST CANCER: ICD-10-CM

## 2023-08-23 NOTE — PLAN OF CARE
I saw the patient in the [facility] today (8/23/23). She reports a history of bilateral breast cancers diagnosed at the same time around 15 years ago, treated with bilateral mastectomies with removal of the nipple areolar complexes. Her NAC tattoos were confirmed by the mammography technologist. With this history, I do not recommend the patient undergo annual screening with mammography.     The patient does not need an annual screening mammogram today or in the future.      She may present for diagnostic breast imaging if she develops any breast symptoms that are concerning to her or her doctor.     I discussed the above information with the patient in the [facility] today.      Jason Victoria M.D.  Breast Imaging and Intervention  [facility]

## 2023-09-22 ENCOUNTER — OFFICE VISIT (OUTPATIENT)
Dept: NEUROLOGY | Facility: CLINIC | Age: 63
End: 2023-09-22
Payer: MEDICARE

## 2023-09-22 DIAGNOSIS — G47.19 EXCESSIVE DAYTIME SLEEPINESS: ICD-10-CM

## 2023-09-22 DIAGNOSIS — G47.33 OBSTRUCTIVE SLEEP APNEA: Primary | ICD-10-CM

## 2023-09-22 PROCEDURE — 99204 PR OFFICE/OUTPT VISIT, NEW, LEVL IV, 45-59 MIN: ICD-10-PCS | Mod: 95,,, | Performed by: PSYCHIATRY & NEUROLOGY

## 2023-09-22 PROCEDURE — 99204 OFFICE O/P NEW MOD 45 MIN: CPT | Mod: 95,,, | Performed by: PSYCHIATRY & NEUROLOGY

## 2023-09-22 NOTE — PROGRESS NOTES
Subjective     Patient ID: Trixie Toth is a 63 y.o. female.    Chief Complaint: No chief complaint on file.    HPI  The patient location is: home  The chief complaint leading to consultation is: abby, EDS    Visit type: audiovisual    Face to Face time with patient: 12 min  15 minutes of total time spent on the encounter, which includes face to face time and non-face to face time preparing to see the patient (eg, review of tests), Obtaining and/or reviewing separately obtained history, Documenting clinical information in the electronic or other health record, Independently interpreting results (not separately reported) and communicating results to the patient/family/caregiver, or Care coordination (not separately reported).     My location: [facility]    Each patient to whom he or she provides medical services by telemedicine is:  (1) informed of the relationship between the physician and patient and the respective role of any other health care provider with respect to management of the patient; and (2) notified that he or she may decline to receive medical services by telemedicine and may withdraw from such care at any time.    PMH CHF/cardiomyopathy  S/p heart txp 18 mos ago; did have some rejection  Current EF is 40%    Excessive daytime sleepiness, RLS, and onset/maintenance insomnia  Never had sleep study  Excessive wt gain over last 2 mos 2.2 hyperparathyroidism  No observed snoring        9/19/2023     9:44 AM   EPWORTH SLEEPINESS SCALE   Sitting and reading 2   Watching TV 1   Sitting, inactive in a public place (e.g. a theatre or a meeting) 1   As a passenger in a car for an hour without a break 1   Lying down to rest in the afternoon when circumstances permit 2   Sitting and talking to someone 0   Sitting quietly after a lunch without alcohol 1   In a car, while stopped for a few minutes in traffic 0   Total score 8       Review of Systems  The remainder of the 14 system ROS is noncontributory or  negative unless mentioned/reviewed above.       Objective     Physical Exam  Mental Status: Alert and oriented x3. Language is fluent with good comprehension.    Cranial Nerve: Ocular movements are intact. Face is symmetric at rest and with activation with intact sensation throughout. Hearing intact to finger rub bilaterally. Muscles of tongue and palate activate symmetrically. No dysarthria. Strength is full in sternocleidomastoid and trapezius bilaterally.    Motor: Strength is 5/5 in all four extremities both proximally and distally. Intact fine motor movements bilaterally.   Sensory: Sensation is intact to light touch, pinprick, vibration, and proprioception throughout. Romberg is negative.    Mall 4  Neck17       Assessment and Plan     1. Excessive daytime sleepiness  -     Ambulatory referral/consult to Sleep Disorders        PSG         No follow-ups on file.

## 2023-10-22 ENCOUNTER — NURSE TRIAGE (OUTPATIENT)
Dept: ADMINISTRATIVE | Facility: CLINIC | Age: 63
End: 2023-10-22
Payer: MEDICARE

## 2023-10-22 NOTE — TELEPHONE ENCOUNTER
Reason for Disposition   [1] Caller requests to speak ONLY to PCP AND [2] URGENT question    Additional Information   Negative: [1] Follow-up call from patient regarding patient's clinical status AND [2] information urgent   Negative: Lab or radiology calling with CRITICAL test results   Negative: Doctor (or NP/PA) call to PCP   Negative: Call about patient who is currently hospitalized   Negative: ED call to PCP (i.e., primary care provider; doctor, NP, or PA)    Protocols used: PCP Call - No Triage-A-  pt called re appt made for suly. Pt asking about having an mammogram. pt states she has a painful grapefruit size lump above the L bosom. Hx fell and fx back. Pt reports that this is the first time she noticed the inga\mp. Site painful to touch. Rating pain= 4-5. Not hot not red. Hx heart transplant. No CP, no SOB. Pt warm transferred to speak with heart transplant

## 2023-10-23 ENCOUNTER — TELEPHONE (OUTPATIENT)
Dept: FAMILY MEDICINE | Facility: CLINIC | Age: 63
End: 2023-10-23

## 2023-10-23 NOTE — TELEPHONE ENCOUNTER
Spoke to pt. She states that she has a lump in the left breast and would a referral to Dr Law. I spoke to Dr Polo. She states that she needs an appt for an evaluation and to see if testing is needed prior to referring her. Called pt and let her know of the recommendations. She states that she will call Dr Law's office herself and make an appt and call our office back if needed. Dr Polo was notified.

## 2023-10-23 NOTE — TELEPHONE ENCOUNTER
----- Message from [facility] sent at 10/23/2023  8:04 AM CDT -----  Regarding: referral  .Type:  Patient Requesting Referral    Who Called: pt    Does the patient already have the specialty appointment scheduled?: no    If yes, what is the date of that appointment?:no    Referral to What Specialty: Breast Center with Dr. Law    Reason for Referral: lump in her left breast    Does the patient want the referral with a specific physician?: Dr. Claudia Law    Is the specialist an [facility]?: yes    Patient Requesting a Response?: yes    Would the patient rather a call back? Yes    Best Call Back Number: [phone]    Additional Information:  pt needs a referral to Dr. Law

## 2023-10-24 ENCOUNTER — PATIENT OUTREACH (OUTPATIENT)
Dept: ADMINISTRATIVE | Facility: HOSPITAL | Age: 63
End: 2023-10-24
Payer: MEDICARE

## 2023-10-24 ENCOUNTER — TELEPHONE (OUTPATIENT)
Dept: FAMILY MEDICINE | Facility: CLINIC | Age: 63
End: 2023-10-24

## 2023-10-24 ENCOUNTER — OFFICE VISIT (OUTPATIENT)
Dept: FAMILY MEDICINE | Facility: CLINIC | Age: 63
End: 2023-10-24
Payer: MEDICARE

## 2023-10-24 VITALS
OXYGEN SATURATION: 96 % | SYSTOLIC BLOOD PRESSURE: 116 MMHG | RESPIRATION RATE: 20 BRPM | HEIGHT: 61 IN | WEIGHT: 146.31 LBS | BODY MASS INDEX: 27.63 KG/M2 | HEART RATE: 109 BPM | TEMPERATURE: 99 F | DIASTOLIC BLOOD PRESSURE: 82 MMHG

## 2023-10-24 DIAGNOSIS — F33.9 MAJOR DEPRESSION, RECURRENT, CHRONIC: ICD-10-CM

## 2023-10-24 DIAGNOSIS — Z23 NEED FOR INFLUENZA VACCINATION: ICD-10-CM

## 2023-10-24 DIAGNOSIS — Z85.3 HISTORY OF BREAST CANCER: Primary | Chronic | ICD-10-CM

## 2023-10-24 DIAGNOSIS — N63.21 MASS OF UPPER OUTER QUADRANT OF LEFT BREAST: ICD-10-CM

## 2023-10-24 PROCEDURE — G0008 FLU VACCINE - QUADRIVALENT - ADJUVANTED: ICD-10-PCS | Mod: ,,, | Performed by: FAMILY MEDICINE

## 2023-10-24 PROCEDURE — 90694 FLU VACCINE - QUADRIVALENT - ADJUVANTED: ICD-10-PCS | Mod: ,,, | Performed by: FAMILY MEDICINE

## 2023-10-24 PROCEDURE — G0008 ADMIN INFLUENZA VIRUS VAC: HCPCS | Mod: ,,, | Performed by: FAMILY MEDICINE

## 2023-10-24 PROCEDURE — 99214 PR OFFICE/OUTPT VISIT, EST, LEVL IV, 30-39 MIN: ICD-10-PCS | Mod: ,,, | Performed by: FAMILY MEDICINE

## 2023-10-24 PROCEDURE — 90694 VACC AIIV4 NO PRSRV 0.5ML IM: CPT | Mod: ,,, | Performed by: FAMILY MEDICINE

## 2023-10-24 PROCEDURE — 99214 OFFICE O/P EST MOD 30 MIN: CPT | Mod: ,,, | Performed by: FAMILY MEDICINE

## 2023-10-24 RX ORDER — LOSARTAN POTASSIUM 50 MG/1
50 TABLET ORAL DAILY
COMMUNITY
Start: 2023-09-19

## 2023-10-24 RX ORDER — TRAMADOL HYDROCHLORIDE 50 MG/1
50 TABLET ORAL 3 TIMES DAILY
COMMUNITY
Start: 2023-09-22

## 2023-10-24 NOTE — LETTER
"  This communication is flagged as high priority.        AUTHORIZATION FOR RELEASE OF   CONFIDENTIAL INFORMATION    Dear Staff,    We are seeing Trixie Toth, date of birth 1960, in the clinic at [facility]. Kasia Sheppard MD is the patient's PCP. Trixie Toth has an outstanding lab/procedure at the time we reviewed her chart. In order to help keep her health information updated, she has authorized us to request the following medical record(s):        (  )  MAMMOGRAM                                      (xx  )  COLONOSCOPY/PATH      (  )  PAP SMEAR                                          (  )  OUTSIDE LAB RESULTS     (  )  DEXA SCAN                                          (  )  EYE EXAM            (  )  FOOT EXAM                                          (  )  ENTIRE RECORD     (  )  OUTSIDE IMMUNIZATIONS                 (  )  _______________         Please fax records to [facility], Bienvenu-Oubre, Shauna, MD,  [phone]  Attn: Heena        If you have any questions, please contact Juan Murrieta" Lelia ,Care Coordinator @ [phone]        Patient Name: Trixie Toth  : 1960  Patient Phone #: [phone]     "

## 2023-10-24 NOTE — PROGRESS NOTES
Trixie Toth  10/24/2023  [id number]    Kasia Sheppard MD  Patient Care Team:  Kasia Sheppard MD as PCP - General (Family Medicine)  Andrew Zhu MD as Consulting Physician (Cardiology)      Chief Complaint:  Chief Complaint   Patient presents with    Breast Mass     Pt states that she feels a left breast lump. Thinks that it may be the breast implant that moved. The area is not as big today. Noticed this 2 days ago       History of Present Illness:    63 y.o. female who presents today for lump in left breast that she noticed two days ago. Of note, patient has a history of breast cancer and is s/p bl mastectomy with reconstruction and implants. The lump has decreased in size and she realizes that it may be her implant that moved. She denies skin changes, nipple discharge, lymphadenopathy.     Review of Systems  General: denies f/c, weight loss, night sweats, decreased appetite  Eye: denies blurred vision, changes in vision  Respiratory: denies sob, wheezing, cough  Cardiovascular: denies chest pain, palpitations, edema  Gastrointestinal: denies abdominal pain, n/v, constipation, diarrhea  Integumentary: denies rashes, pruritis    Past Medical History  Past Medical History:   Diagnosis Date    Breast cancer     Cancer     breast    Cardiac transplant rejection     Cardiomyopathy     CHF (congestive heart failure)     Hyperlipidemia     Hypertension     MVP (mitral valve prolapse)     Parathyroid disorder        Medications  Medication List with Changes/Refills   Current Medications    ASPIRIN (ECOTRIN) 81 MG EC TABLET    Take 81 mg by mouth once daily.    ATORVASTATIN (LIPITOR) 40 MG TABLET    Take 40 mg by mouth every evening.    AUGMENTED BETAMETHASONE DIPROPIONATE (DIPROLENE-AF) 0.05 % CREAM    Apply topically 2 (two) times daily.    BROMELAINS 500 MG TAB    Take by mouth.    BUPROPION (WELLBUTRIN XL) 150 MG TB24 TABLET    Take 150 mg by mouth once daily.    CARVEDILOL (COREG) 12.5 MG TABLET     Take 12.5 mg by mouth 2 (two) times daily.    CHOLECALCIFEROL, VITAMIN D3, 125 MCG (5,000 UNIT) CAPSULE    Take 5,000 Units by mouth Daily.    CITALOPRAM (CELEXA) 20 MG TABLET    Take 20 mg by mouth every morning.    CITALOPRAM (CELEXA) 40 MG TABLET    Take 20 mg by mouth once daily.    CLONAZEPAM (KLONOPIN) 0.5 MG TABLET    Take 0.5 mg by mouth nightly as needed.    CLOPIDOGREL (PLAVIX) 75 MG TABLET    Take 75 mg by mouth once daily.    CO-ENZYME Q-10 30 MG CAPSULE    Take 30 mg by mouth 3 (three) times daily.    ENTRESTO 24-26 MG PER TABLET    Take 1 tablet by mouth 2 (two) times daily.    FERROUS SULFATE (FEOSOL) 325 MG (65 MG IRON) TAB TABLET    Take 325 mg by mouth once daily.    LOSARTAN (COZAAR) 50 MG TABLET    Take 50 mg by mouth once daily.    MAGNESIUM OXIDE (MAG-OX) 400 MG (241.3 MG MAGNESIUM) TABLET    Take 800 mg by mouth 3 (three) times daily.    MYCOPHENOLATE (CELLCEPT) 500 MG TAB    Take 1,000 mg by mouth 2 (two) times daily.    OMEGA-3 FATTY ACIDS/FISH OIL (FISH OIL-OMEGA-3 FATTY ACIDS) 300-1,000 MG CAPSULE    Take by mouth once daily.    PANTOPRAZOLE (PROTONIX) 40 MG TABLET    Take 40 mg by mouth.    POTASSIUM CHLORIDE SA (K-DUR,KLOR-CON) 20 MEQ TABLET    Take 20 mEq by mouth once daily.    POTASSIUM PHOSPHATE, MONOBASIC, (K-PHOS ORIGINAL) 500 MG TBSO    Take 500 mg by mouth 2 (two) times daily.    PREDNISONE (DELTASONE) 5 MG TABLET    Take 5 mg by mouth.    TACROLIMUS (PROGRAF) 1 MG CAP    Take by mouth. 1 cap twice a day    TRAMADOL (ULTRAM) 50 MG TABLET    Take 50 mg by mouth 3 (three) times daily.   Discontinued Medications    ALPRAZOLAM (XANAX) 1 MG TABLET    Take 1 mg by mouth Daily.    FUROSEMIDE (LASIX) 40 MG TABLET    Take 40 mg by mouth 2 (two) times daily.    LOSARTAN (COZAAR) 25 MG TABLET    Take 25 mg by mouth.    PRAVASTATIN (PRAVACHOL) 40 MG TABLET    Take 40 mg by mouth every evening.       Past Surgical History:   Procedure Laterality Date     SECTION      CHOLECYSTECTOMY    "   HEART TRANSPLANT      Complications (rejection)    HYSTERECTOMY      MASTECTOMY      TONSILLECTOMY         SUBJECTIVE:  Health Maintenance  Health Maintenance Topics with due status: Not Due       Topic Last Completion Date    High Dose Statin 07/18/2023    Hemoglobin A1c (Diabetic Prevention Screening) 09/18/2023    Lipid Panel 09/18/2023    DEXA Scan 09/18/2023     Health Maintenance Due   Topic Date Due    HIV Screening  Never done    TETANUS VACCINE  Never done    Shingles Vaccine (1 of 2) Never done    Colorectal Cancer Screening  Never done    COVID-19 Vaccine (3 - 2023-24 season) 09/01/2023       Exam:  Vitals:    10/24/23 1322   BP: 116/82   BP Location: Right arm   Patient Position: Sitting   BP Method: Large (Automatic)   Pulse: 109   Resp: 20   Temp: 98.6 °F (37 °C)   TempSrc: Oral   SpO2: 96%   Weight: 66.4 kg (146 lb 4.8 oz)   Height: 5' 1" (1.549 m)     Weight: 66.4 kg (146 lb 4.8 oz)   Body mass index is 27.64 kg/m².      Physical Exam  Constitutional: NAD, alert, pleasant  Respiratory: No accessory muscle use, no cough or audible wheezing  Eyes: EOMI, no conjunctivitis   Integumentary: warm, dry, intact  Psych: AA&Ox3, answers questions appropriately  Breast exam: Right breast: no masses, skin changes, nipple discharge, erythema, supraclavicular or axillary lymphadenopathy  Left breast: no skin changes, nipple discharge, erythema, supraclavicular or axillary lymphadenopathy. There is asymmetry of the left breast with a fullness in upper outer quadrant, but it feels like the implant.         ICD-10-CM ICD-9-CM   1. History of breast cancer  Z85.3 V10.3   2. Major depression, recurrent, chronic  F33.9 296.30   3. Mass of upper outer quadrant of left breast  N63.21 611.72   4. Need for influenza vaccination  Z23 V04.81       1. History of breast cancer  Overview:   Breast cancer (HCC) 10/2007   Mastectomy for T1cN0 ER/[place] positive breast cancer. Adjuvant Taxotere/Cytoxan x 4 cycles, then AI x 3 yrs "       Patient has a history of breast cancer but has not had a mmg in years. NO longer sees Dr. Zuniga but that is who she was seeing.     Orders:  -     Mammo Digital Diagnostic Bilat with Srikanth; Future; Expected date: 11/24/2023  -     US Breast Bilateral Complete; Future; Expected date: 11/24/2023    2. Major depression, recurrent, chronic  Overview:  Well controlled with celexa and klonopin. Denies si/hi.     Continue current Rx meds        3. Mass of upper outer quadrant of left breast  -     Mammo Digital Diagnostic Bilat with Srikanth; Future; Expected date: 11/24/2023  -     US Breast Bilateral Complete; Future; Expected date: 11/24/2023    4. Need for influenza vaccination  -     Influenza (FLUAD) - Quadrivalent (Adjuvanted) *Preferred* (65+) (PF)     Will get mmg and US. Patient declines plastics referral at this time, but likely just implant displacement    Follow up: No follow-ups on file.      Care Plan/Goals: Reviewed   Goals    None

## 2023-10-26 LAB — EGD FOLLOW UP EXTERNAL: NORMAL

## 2023-10-30 ENCOUNTER — TELEPHONE (OUTPATIENT)
Dept: NEUROLOGY | Facility: CLINIC | Age: 63
End: 2023-10-30
Payer: MEDICARE

## 2023-10-30 NOTE — TELEPHONE ENCOUNTER
Pt states she was advised by Dr. Ford to have a sleep study done, but she has not been contacted yet.   States seeking callback from sleep center.

## 2023-11-30 ENCOUNTER — TELEPHONE (OUTPATIENT)
Dept: FAMILY MEDICINE | Facility: CLINIC | Age: 63
End: 2023-11-30
Payer: MEDICARE

## 2023-11-30 DIAGNOSIS — Z94.1 HEART TRANSPLANT RECIPIENT: Chronic | ICD-10-CM

## 2023-11-30 DIAGNOSIS — I50.42 CHRONIC COMBINED SYSTOLIC AND DIASTOLIC HEART FAILURE: Primary | Chronic | ICD-10-CM

## 2024-01-29 ENCOUNTER — HOSPITAL ENCOUNTER (OUTPATIENT)
Dept: RADIOLOGY | Facility: HOSPITAL | Age: 64
Discharge: HOME | End: 2024-01-29
Attending: FAMILY MEDICINE
Payer: MEDICARE

## 2024-01-29 ENCOUNTER — TELEPHONE (OUTPATIENT)
Dept: FAMILY MEDICINE | Facility: CLINIC | Age: 64
End: 2024-01-29
Payer: MEDICARE

## 2024-01-29 VITALS — BODY MASS INDEX: 27.94 KG/M2 | WEIGHT: 148 LBS | HEIGHT: 61 IN

## 2024-01-29 DIAGNOSIS — Z00.00 ENCOUNTER FOR WELLNESS EXAMINATION: Primary | ICD-10-CM

## 2024-01-29 DIAGNOSIS — N63.21 MASS OF UPPER OUTER QUADRANT OF LEFT BREAST: ICD-10-CM

## 2024-01-29 DIAGNOSIS — R79.9 ABNORMAL FINDING OF BLOOD CHEMISTRY, UNSPECIFIED: ICD-10-CM

## 2024-01-29 DIAGNOSIS — I50.42 CHRONIC COMBINED SYSTOLIC AND DIASTOLIC HEART FAILURE: Chronic | ICD-10-CM

## 2024-01-29 DIAGNOSIS — Z85.3 HISTORY OF BREAST CANCER: Chronic | ICD-10-CM

## 2024-01-29 PROCEDURE — 76642 ULTRASOUND BREAST LIMITED: CPT | Mod: 26,LT,, | Performed by: STUDENT IN AN ORGANIZED HEALTH CARE EDUCATION/TRAINING PROGRAM

## 2024-01-29 PROCEDURE — 77062 BREAST TOMOSYNTHESIS BI: CPT | Mod: 26,,, | Performed by: STUDENT IN AN ORGANIZED HEALTH CARE EDUCATION/TRAINING PROGRAM

## 2024-01-29 PROCEDURE — 76642 ULTRASOUND BREAST LIMITED: CPT | Mod: TC,LT

## 2024-01-29 PROCEDURE — 77066 DX MAMMO INCL CAD BI: CPT | Mod: TC

## 2024-01-29 PROCEDURE — 77066 DX MAMMO INCL CAD BI: CPT | Mod: 26,,, | Performed by: STUDENT IN AN ORGANIZED HEALTH CARE EDUCATION/TRAINING PROGRAM

## 2024-05-06 ENCOUNTER — TELEPHONE (OUTPATIENT)
Dept: FAMILY MEDICINE | Facility: CLINIC | Age: 64
End: 2024-05-06
Payer: MEDICARE

## 2024-05-06 DIAGNOSIS — R11.0 NAUSEA: Primary | ICD-10-CM

## 2024-05-06 RX ORDER — ONDANSETRON 4 MG/1
4 TABLET, ORALLY DISINTEGRATING ORAL EVERY 8 HOURS PRN
Qty: 12 TABLET | Refills: 0 | Status: SHIPPED | OUTPATIENT
Start: 2024-05-06 | End: 2024-05-10

## 2024-05-06 NOTE — TELEPHONE ENCOUNTER
Pt notified of Dr Chapman's recommendations and that the script of Zofran was sent to the pharmacy. Verbal understanding given

## 2024-05-06 NOTE — TELEPHONE ENCOUNTER
Pt states that she had some vomiting on yesterday but not on today but states that she is really nauseated. She is asking if a script of Zofran can be sent to the pharmacy. Please advise

## 2024-05-06 NOTE — TELEPHONE ENCOUNTER
----- Message from Bernadette Boothe sent at 5/6/2024 11:24 AM CDT -----  Regarding: ADVICE  .Type:  Needs Medical Advice    Who Called: PT    Symptoms (please be specific): VOMITING & NAUSEA     How long has patient had these symptoms:  YESTERDAY    Pharmacy name and phone #:  [id number] - [place] - [address] AT [address]   Phone: [phone]  Fax: [phone]        Would the patient rather a call back or a response via [facility]?     Best Call Back Number: [phone]    Additional Information: PLEASE ADVISE. THANKS; PT IS REQUESTING ZOFRAN.

## 2024-06-03 ENCOUNTER — TELEPHONE (OUTPATIENT)
Dept: FAMILY MEDICINE | Facility: CLINIC | Age: 64
End: 2024-06-03
Payer: MEDICARE

## 2024-06-03 DIAGNOSIS — T85.44XD CAPSULAR CONTRACTURE OF BREAST IMPLANT, SUBSEQUENT ENCOUNTER: Primary | ICD-10-CM

## 2024-06-03 NOTE — TELEPHONE ENCOUNTER
Pt is now ready to have the referral to Dr Oconnor for her breast implants to be checked/fixed. I did let pt know that an appt may be needed. She is asking if the referral can be made without an appt because she has a lot going on and already has a lot of Dr's appts to go to. Please advise.

## 2024-06-03 NOTE — TELEPHONE ENCOUNTER
----- Message from Loan Quinn sent at 6/3/2024 10:47 AM CDT -----  .Who Called: Trixie Toth    Does the patient already have the specialty appointment scheduled?:no  If yes, what is the date of that appointment?:no  Referral to What Specialty:plastic surgery  Reason for Referral: previous breast cancer pt  Does the patient want the referral with a specific physician?:yes  If yes, which provider?:   Is the specialist an [facility]?:    Preferred Method of Contact: Phone Call  Patient's Preferred Phone Number on File: [phone]   Best Call Back Number, if different:  Additional Information:  [phone]

## 2024-06-03 NOTE — TELEPHONE ENCOUNTER
I placed the referral, however, patient has canceled all appt with me and has not done her medicare wellness with labs that have been ordered.     Please transfer phone to [place] to schedule medicare wellness with labs asap

## 2024-06-07 NOTE — TELEPHONE ENCOUNTER
Pt notified that the referral was placed. She asked me to send her lab orders to [facility] and that she would like to schedule her wellness. I advised her that Ila would call her next week to schedule. Verbal understanding given. Lab orders faxed to [facility] on [address] as per pts request.     Ila please call pt and schedule a medicare wellness with labs

## 2024-11-12 ENCOUNTER — PATIENT OUTREACH (OUTPATIENT)
Facility: CLINIC | Age: 64
End: 2024-11-12
Payer: MEDICARE

## 2024-11-12 NOTE — PROGRESS NOTES
Value base Outreach  No answer,left message for call back to discuss overdue Health maintenance Topics.  Need remote BP, if done.  Eligible for DM  Schedule AW

## 2025-02-07 DIAGNOSIS — Z41.9 SURGERY, ELECTIVE: Primary | ICD-10-CM

## 2025-02-13 ENCOUNTER — OFFICE VISIT (OUTPATIENT)
Dept: SURGERY | Facility: CLINIC | Age: 65
End: 2025-02-13
Payer: MEDICARE

## 2025-02-13 VITALS
DIASTOLIC BLOOD PRESSURE: 79 MMHG | BODY MASS INDEX: 25.52 KG/M2 | HEART RATE: 108 BPM | SYSTOLIC BLOOD PRESSURE: 113 MMHG | TEMPERATURE: 98 F | OXYGEN SATURATION: 98 % | HEIGHT: 60 IN | RESPIRATION RATE: 20 BRPM | WEIGHT: 130 LBS

## 2025-02-13 DIAGNOSIS — I10 ESSENTIAL HYPERTENSION: Chronic | ICD-10-CM

## 2025-02-13 DIAGNOSIS — D84.9 IMMUNODEFICIENCY: ICD-10-CM

## 2025-02-13 DIAGNOSIS — Z85.3 HX OF BREAST CANCER: Primary | ICD-10-CM

## 2025-02-13 DIAGNOSIS — Z41.9 SURGERY, ELECTIVE: ICD-10-CM

## 2025-02-13 DIAGNOSIS — Z01.818 PREOP EXAMINATION: Primary | ICD-10-CM

## 2025-02-13 PROCEDURE — 99999 PR PBB SHADOW E&M-EST. PATIENT-LVL V: CPT | Mod: PBBFAC,,, | Performed by: STUDENT IN AN ORGANIZED HEALTH CARE EDUCATION/TRAINING PROGRAM

## 2025-02-13 PROCEDURE — 99215 OFFICE O/P EST HI 40 MIN: CPT | Mod: PBBFAC | Performed by: STUDENT IN AN ORGANIZED HEALTH CARE EDUCATION/TRAINING PROGRAM

## 2025-02-13 RX ORDER — PRAVASTATIN SODIUM 40 MG/1
40 TABLET ORAL NIGHTLY
COMMUNITY
Start: 2024-11-13

## 2025-02-13 RX ORDER — CALCIUM CARBONATE 300MG(750)
TABLET,CHEWABLE ORAL
COMMUNITY

## 2025-02-13 NOTE — PROGRESS NOTES
[facility] Breast Surg  Breast Surgical Oncology  New Patient Office Visit - H&P      Care Team: Patient Care Team:  Kasia Sheppard MD as PCP - General (Family Medicine)  Andrew Zhu MD as Consulting Physician (Cardiology)  Juan Rowell LPN as Care Coordinator   Referring Provider: Dr. Toan Oconnor    Chief Complaint:   Chief Complaint   Patient presents with    Pre-op Exam     Patient c/o UOQ bilateral breast intermittent dull aching pain x 2 months, worse at night, heaviness, related to silicone implant moved, she's having bilateral implants removed with Dr Oconnor no surgery date scheduled until approved clearance          Subjective:      History of Present Illness:  Trixie Toth is a pleasant 65 y.o.female who presents as a referral from Dr. Toan Oconnor for history of breast cancer.  Today she is having some bilateral pain in the lateral aspect of her flaps and into her axilla. L>R pain. This has been going on for about 2 months. Denies any new masses or skin changes.      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  She was diagnosed with DCIS in 2007 and underwent a left breast nipple sparing mastectomy with implant reconstruction.  At the same time she had a right breast mastopexy and was found to have a 1.1 cm grade 1 ER WY positive invasive ductal carcinoma in the resected breast tissue.  She then underwent a right mastectomy and no additional cancer was found in the right mastectomy specimen.  She then went on to have resection of the left nipple-areolar complex and bilateral flap reconstruction.  She completed adjuvant chemotherapy and took an aromatase inhibitor for 3 years.  Additionally on the day of her right mastectomy she underwent GELACIO/BSO to evaluate a persisting ovarian cyst which was benign. However at surgery and the  incidental appendectomy specimen she was found to have a 1.4 cm carcinoid. She then underwent a limited right hemicolectomy with abdominal exploration which she understood was negative any additional evidence of carcinoid.  PET scan on 08/27/2021 showed no evidence of malignancy and she was cleared from a medical oncologist for a heart transplant as she had been cancer free for 14 years.    Imaging:   Diagnostic mammogram 01/29/2024:  Density a.  BI-RADS 2.  No suspicious findings in the left breast upper-outer quadrant area of concern genetic by radiopaque triangle sticker.  Ultrasound: Targeted sonographic evaluation of the 12:00 axis was performed, revealing no suspicious finding. At the patient indicated area of concern in the left breast 12:00 axis 7 cm FN position, there is normal fibrofatty tissue, muscular tissue related to autologous tissue flap reconstruction, and the saline implant. The palpable lump described by the patient is caused by pushing upward on the bottom of the implant, shifting fluid within the implant towards the 12:00 axis of the breast. This is benign. There is no samuel-implant fluid collection. There is no suspicious sonographic correlate for this area of concern.     I have reviewed the imaging and agree with the radiologists interpretation. I have discussed these results with the patient.    Pathology:   none    OB / GYN History   Menarche Onset:10  Menopause: Menopause: postmenopausal  Hormonal birth control (duration): 20years  Pregnancies: 3  Age at first child birth: 29  Child births: 2  Hysterectomy/Oophorectomy: hysterectomy with BSO, at age 48  HRT: no    Family History of Cancer (& age at diagnosis):  -   Family History   Problem Relation Name Age of Onset    Heart failure Mother      Heart failure Father      Heart attack Father          Lifestyle:  Height and Weight:   BMI: Body mass index is 25.39 kg/m².       Other History:     Past Medical History:   Diagnosis Date    Breast  cancer     Cancer     breast    Cardiac transplant rejection     Cardiomyopathy     CHF (congestive heart failure)     Hyperlipidemia     Hypertension     MVP (mitral valve prolapse)     Parathyroid disorder     Personal history of colonic polyps         Past Surgical History:   Procedure Laterality Date    ADENOIDECTOMY       SECTION      CHOLECYSTECTOMY      COLONOSCOPY  2020    Dr. Daniella Ceja ([facility]) -polyp- repeat in 5 years    HEART TRANSPLANT      Complications (rejection)    HYSTERECTOMY      MASTECTOMY Bilateral     2008    TONSILLECTOMY          Social History     Socioeconomic History    Marital status:    Tobacco Use    Smoking status: Never    Smokeless tobacco: Never   Substance and Sexual Activity    Alcohol use: Not Currently    Drug use: Never     Social Drivers of Health     Financial Resource Strain: Low Risk  (2023)    Overall Financial Resource Strain (CARDIA)     Difficulty of Paying Living Expenses: Not very hard    [facility]    Food Insecurity   Transportation Needs: No Transportation Needs (2023)    PRAPARE - Transportation     Lack of Transportation (Medical): No     Lack of Transportation (Non-Medical): No   Physical Activity: Insufficiently Active (2023)    Exercise Vital Sign     Days of Exercise per Week: 3 days     Minutes of Exercise per Session: 20 min   Stress: Stress Concern Present (2023)    [employer] - Occupational Stress Questionnaire     Feeling of Stress : To some extent    [facility] from [facility]    Housing Stability        Immunization History   Administered Date(s) Administered    COVID-19, MRNA, LN-S, PF (Pfizer) (Purple Cap) 2021, 2021    Hepatitis B 2021    Influenza (FLUAD) - Quadrivalent - Adjuvanted - PF *Preferred* (65+) 10/24/2023    Influenza -  Trivalent - Fluzone High Dose - PF (65 years and older) 10/18/2022    Pneumococcal Conjugate - 20 Valent 07/18/2023        Medications/Allergies:       Current Outpatient Medications   Medication Instructions    aspirin (ECOTRIN) 81 mg, Daily    atorvastatin (LIPITOR) 40 mg, Nightly    buPROPion (WELLBUTRIN XL) 150 mg, Daily    carvediloL (COREG) 12.5 mg, 2 times daily    cholecalciferol (vitamin D3) 5,000 Units, Daily    clonazePAM (KLONOPIN) 0.5 mg, Nightly PRN    co-enzyme Q-10 30 mg, 3 times daily    ferrous sulfate (FEOSOL) 325 mg, Daily    losartan (COZAAR) 50 mg, Daily    magnesium oxide (MAG-OX) 800 mg, 3 times daily    magnesium oxide 400 mg magnesium Tab 1 tablet as needed Orally Once a day for 30 day(s)    mycophenolate (CELLCEPT) 1,000 mg, 2 times daily    omega-3 fatty acids/fish oil (FISH OIL-OMEGA-3 FATTY ACIDS) 300-1,000 mg capsule Daily    pantoprazole (PROTONIX) 40 mg    potassium chloride SA (K-DUR,KLOR-CON) 20 MEQ tablet 20 mEq, Daily    pravastatin (PRAVACHOL) 40 mg, Nightly    predniSONE (DELTASONE) 5 mg    tacrolimus (PROGRAF) 1 MG Cap Take by mouth. 1 cap twice a day    traMADoL (ULTRAM) 50 mg, 3 times daily       Review of patient's allergies indicates:   Allergen Reactions    Iodine Rash and Shortness Of Breath    Kiwi Anaphylaxis     facial swelling    Penicillins Rash    Vancomycin Rash    Adhesive tape-silicones Other (See Comments)     Skin peels off if stay for long time. Patient stated .    Atorvastatin      Other reaction(s): leg cramps    Avocado      Other reaction(s): rash    Bactrim [sulfamethoxazole-trimethoprim]      thrombocytopenia    Diphenhydramine-pseudoephed      Other reaction(s): rls    Metoclopramide      Other reaction(s): diarrhea    Latex Rash     rash  rash        Review of Systems:      ROS    Constitutional: denies fevers, chills, weight loss  HEENT: denies blurry/double vision, changes in hearing, odynophagia, dysphagia  Respiratory: denies cough, shortness of  breath  Cardiovascular: denies palpitations, swelling of the extremities  GI: denies abdominal pain, nausea/vomiting, hematochezia, frequent stools  : denies frequency, dysuria, flank pain, hematuria  Skin: denies new rashes  Neurological: denies muscular/sensory deficiencies, loss of coordination, headaches, memory changes  Endo: denies hair loss/thinning, nervousness, hot flashes, heat/cold intolerance, lumps in the neck area  Heme: denies easy bruising and fatigue  Psychological: denies anxious/depressive moods  Musculoskeletal: denies bony pain, muscle cramps, swollen joints       Objective/Physical Exam   Visit Vitals  /79 (BP Location: Left arm, Patient Position: Sitting)   Pulse 108   Temp 97.8 °F (36.6 °C) (Oral)   Resp 20   Ht 5' (1.524 m)   Wt 59 kg (130 lb)   SpO2 98%   BMI 25.39 kg/m²        Physical Exam    General: The patient is awake, alert and oriented. The patient is well nourished. No acute distress.  Neck: The neck is supple.   Musculoskeletal: The patient has a normal range of motion of her bilateral upper extremities.  Heart: Regular rate and rhythm, no murmurs.   Lung: No increased work of breathing. Clear breath sounds bilaterally.  Lymph nodes: There is no axillary, supraclavicular or cervical lymphadenopathy.  Breast:   BL breast implants and well healed surgical scars from prior surgeries.   Right: No skin changes. No palpable masses.   Left:  no skin changes. No palpable masses. Tenderness on exam in the lateral UOQ.       Assessment and Plan     1. Surgery, elective  - Ambulatory referral/consult to Breast Surgery      Trixie Toth is a very pleasant 65 y.o.female PMHx BL breast cancer who presents with BL breast pain and would like clearance for further breast surgery with Dr. Oconnor.  She has new pain bilaterally, L>R that started within the last couple months. She had MG in 2024 which was normal.  No suspicious findings on physical exam today other than some tenderness to exam in  the L UOQ.  Recommend imaging to further eval.  Pain may be related to implants and prior surgeries.  Will obtain imaging prior to clearance for further breast surgery.  Will call patient with results.  If imaging is normal recommend return to clinic PRN or annually for clinical exam.  She would like to follow up yearly.  All questions answered.    Candie Norris MD  Breast Surgical Oncology       I spent a total of 45 minutes on the day of the visit.  This includes face to face time and non-face to face time preparing to see the patient (eg, review of tests), obtaining and/or reviewing separately obtained history, documenting clinical information in the electronic or other health record, independently interpreting results and communicating results to the patient/family/caregiver, or care coordinator.

## 2025-02-17 ENCOUNTER — HOSPITAL ENCOUNTER (OUTPATIENT)
Dept: RADIOLOGY | Facility: HOSPITAL | Age: 65
Discharge: HOME | End: 2025-02-17
Attending: STUDENT IN AN ORGANIZED HEALTH CARE EDUCATION/TRAINING PROGRAM
Payer: MEDICARE

## 2025-02-17 DIAGNOSIS — Z85.3 PERSONAL HISTORY OF MALIGNANT NEOPLASM OF BREAST: ICD-10-CM

## 2025-02-17 PROCEDURE — 77062 BREAST TOMOSYNTHESIS BI: CPT | Mod: TC

## 2025-02-17 PROCEDURE — 76641 ULTRASOUND BREAST COMPLETE: CPT | Mod: TC,50

## 2025-02-18 ENCOUNTER — RESULTS FOLLOW-UP (OUTPATIENT)
Dept: FAMILY MEDICINE | Facility: CLINIC | Age: 65
End: 2025-02-18
Payer: MEDICARE

## 2025-02-20 ENCOUNTER — OFFICE VISIT (OUTPATIENT)
Dept: FAMILY MEDICINE | Facility: CLINIC | Age: 65
End: 2025-02-20
Payer: MEDICARE

## 2025-02-20 VITALS
DIASTOLIC BLOOD PRESSURE: 85 MMHG | HEART RATE: 110 BPM | OXYGEN SATURATION: 98 % | WEIGHT: 130.81 LBS | RESPIRATION RATE: 20 BRPM | SYSTOLIC BLOOD PRESSURE: 129 MMHG | BODY MASS INDEX: 25.68 KG/M2 | TEMPERATURE: 98 F | HEIGHT: 60 IN

## 2025-02-20 DIAGNOSIS — E03.9 ACQUIRED HYPOTHYROIDISM: Chronic | ICD-10-CM

## 2025-02-20 DIAGNOSIS — Z01.818 PREOP EXAMINATION: Primary | ICD-10-CM

## 2025-02-20 DIAGNOSIS — I50.43 ACUTE ON CHRONIC COMBINED SYSTOLIC AND DIASTOLIC CONGESTIVE HEART FAILURE: ICD-10-CM

## 2025-02-20 DIAGNOSIS — I10 ESSENTIAL HYPERTENSION: Chronic | ICD-10-CM

## 2025-02-20 DIAGNOSIS — E21.0 NORMOCALCEMIC PRIMARY HYPERPARATHYROIDISM: Chronic | ICD-10-CM

## 2025-02-20 DIAGNOSIS — Z79.899 IMMUNOSUPPRESSION DUE TO DRUG THERAPY: Chronic | ICD-10-CM

## 2025-02-20 DIAGNOSIS — D84.821 IMMUNOSUPPRESSION DUE TO DRUG THERAPY: Chronic | ICD-10-CM

## 2025-02-20 DIAGNOSIS — Z94.1 HEART TRANSPLANT RECIPIENT: Chronic | ICD-10-CM

## 2025-02-20 RX ORDER — BUPROPION HYDROCHLORIDE 300 MG/1
300 TABLET ORAL EVERY MORNING
COMMUNITY
Start: 2025-02-11

## 2025-02-20 NOTE — PROGRESS NOTES
Trixie Toth  02/20/2025  [id number]    Kasia Sheppard MD  Patient Care Team:  Kasia Sheppard MD as PCP - General (Family Medicine)  Andrew Zhu MD as Consulting Physician (Cardiology)  Juan Rowell LPN as Care Coordinator      Chief Complaint:  Chief Complaint   Patient presents with    Pre-op Exam     Surgery clearance with Dr Oconnor to remove  breast implants       History of Present Illness:    65 y.o. female who presents today for preop exam to have breast implants removed. Patient has htn, hld, heart transplant with CAD and CHF, on chronic immunosuppressive therapy. Followed closely by transplant team in [place]. She is doing well. No concerns or complaints. No recent hospitalizations.     Visit today included increased complexity associated with the care of the episodic problem htn, cad, chf, heart transplant addressed and managing the longitudinal care of the patient due to the serious and/or complex managed problem(s) .    Preop exam  Active CP-NO  Heart Failure-yes  Valvular Disease-NO  History of CVA-NO  DM? On Insulin? NO  Serum Creatinine >(2.0)-NO    ?Can take care of self, such as eat, dress, or use the toilet (1 MET)-Yes  ?Can walk up a flight of steps or a hill or walk on level ground at 3 to 4 mph (4 METs)-Yes      Review of Systems  General: denies f/c, weight loss, night sweats, decreased appetite  Eye: denies blurred vision, changes in vision  Respiratory: denies sob, wheezing, cough  Cardiovascular: denies chest pain, palpitations, edema  Gastrointestinal: denies abdominal pain, n/v, constipation, diarrhea  Integumentary: denies rashes, pruritis    Past Medical History  Past Medical History:   Diagnosis Date    Breast cancer     Cancer     breast    Cardiac transplant rejection     Cardiomyopathy     CHF (congestive heart failure)     Hyperlipidemia     Hypertension     MVP (mitral valve prolapse)     Parathyroid disorder     Personal history of colonic polyps         Medications  Medication List with Changes/Refills   Current Medications    ASPIRIN (ECOTRIN) 81 MG EC TABLET    Take 81 mg by mouth once daily.    ATORVASTATIN (LIPITOR) 40 MG TABLET    Take 40 mg by mouth every evening.    BUPROPION (WELLBUTRIN XL) 300 MG 24 HR TABLET    Take 300 mg by mouth every morning.    CHOLECALCIFEROL, VITAMIN D3, 125 MCG (5,000 UNIT) CAPSULE    Take 5,000 Units by mouth Daily.    CLONAZEPAM (KLONOPIN) 0.5 MG TABLET    Take 0.5 mg by mouth nightly as needed.    CO-ENZYME Q-10 30 MG CAPSULE    Take 30 mg by mouth 3 (three) times daily.    FERROUS SULFATE (FEOSOL) 325 MG (65 MG IRON) TAB TABLET    Take 325 mg by mouth once daily.    LOSARTAN (COZAAR) 50 MG TABLET    Take 50 mg by mouth 2 (two) times a day.    MAGNESIUM OXIDE (MAG-OX) 400 MG (241.3 MG MAGNESIUM) TABLET    Take 800 mg by mouth 3 (three) times daily.    MAGNESIUM OXIDE 400 MG MAGNESIUM TAB    1 tablet as needed Orally Once a day for 30 day(s)    MYCOPHENOLATE (CELLCEPT) 500 MG TAB    Take 1,000 mg by mouth 2 (two) times daily.    OMEGA-3 FATTY ACIDS/FISH OIL (FISH OIL-OMEGA-3 FATTY ACIDS) 300-1,000 MG CAPSULE    Take by mouth once daily.    PANTOPRAZOLE (PROTONIX) 40 MG TABLET    Take 40 mg by mouth.    POTASSIUM CHLORIDE SA (K-DUR,KLOR-CON) 20 MEQ TABLET    Take 20 mEq by mouth once daily.    PRAVASTATIN (PRAVACHOL) 40 MG TABLET    Take 40 mg by mouth every evening.    PREDNISONE (DELTASONE) 5 MG TABLET    Take 5 mg by mouth.    TACROLIMUS (PROGRAF) 1 MG CAP    Take by mouth. 1 cap twice a day    TRAMADOL (ULTRAM) 50 MG TABLET    Take 50 mg by mouth 3 (three) times daily.   Discontinued Medications    BUPROPION (WELLBUTRIN XL) 150 MG TB24 TABLET    Take 150 mg by mouth once daily.    CARVEDILOL (COREG) 12.5 MG TABLET    Take 12.5 mg by mouth 2 (two) times daily.       Past Surgical History:   Procedure Laterality Date    ADENOIDECTOMY  1964    APPENDECTOMY  1998    BREAST SURGERY  1998    Doublemastectomy- remission      SECTION  1992    CHOLECYSTECTOMY  2014    COLONOSCOPY  2020    Dr. Daniella Ceja ([facility]) -polyp- repeat in 5 years    CORONARY ARTERY BYPASS GRAFT  2017    HEART TRANSPLANT      Complications (rejection)    HYSTERECTOMY  1998    MASTECTOMY Bilateral     2008    TONSILLECTOMY  1964       SUBJECTIVE:  Health Maintenance  Health Maintenance Topics with due status: Not Due       Topic Last Completion Date    Colorectal Cancer Screening 2020    Hemoglobin A1c (Diabetic Prevention Screening) 2025    Lipid Panel 2025    DEXA Scan 2025    High Dose Statin 2025     Health Maintenance Due   Topic Date Due    TETANUS VACCINE  Never done    RSV Vaccine (Age 60+ and Pregnant patients) (1 - Risk 60-74 years 1-dose series) Never done    COVID-19 Vaccine (3 - Pfizer risk series) 2022       Exam:  Vitals:    25 1001   BP: 129/85   BP Location: Left arm   Patient Position: Sitting   Pulse: 110   Resp: 20   Temp: 97.8 °F (36.6 °C)   TempSrc: Oral   SpO2: 98%   Weight: 59.3 kg (130 lb 12.8 oz)   Height: 5' (1.524 m)     Weight: 59.3 kg (130 lb 12.8 oz)   Body mass index is 25.55 kg/m².      Physical Exam  Constitutional: NAD, alert, pleasant  Respiratory: CTAB, no wheezes, rales or rhonchi. No accessory muscle use  Eyes: EOMI  Ears: tm + light reflexes  Oropharynx: no tonsillar erythema or hypertrophy. Mallampatic score 2  Cardiovascular: sinus tachycardia, No m/r/g. No JVD. No LE edema  Gastrointestinal: BS+, nontender, nondistended  Integumentary: warm, dry, intact  Psych: AA&Ox3      ICD-10-CM ICD-9-CM   1. Preop examination  Z01.818 V72.84   2. Normocalcemic primary hyperparathyroidism  E21.0 252.01   3. Immunosuppression due to drug therapy  D84.821 V58.69    Z79.899    4. Heart transplant recipient  Z94.1 V42.1   5. Essential hypertension  I10 401.9   6. Acute on chronic combined systolic and diastolic congestive heart failure  I50.43 428.43     428.0   7.  Acquired hypothyroidism  E03.9 244.9       1. Preop examination    2. Normocalcemic primary hyperparathyroidism  Overview:  Last pth 8/22 at 168. Normal scan in 12/22. See impression below:    IMPRESSION:       1.  Unable to localize a parathyroid adenoma.   2.  No evidence for multiplanar gland hyperplasia or ectopic parathyroid tissue.      Monitored by [facility]      3. Immunosuppression due to drug therapy  Overview:  Due to heart transplant in 2022. Will require long term immunosuppressive therapy. On cellcept and prograf. No recent infections    Continue current Rx meds        4. Heart transplant recipient  Overview:  Patient has heart transplant 2022. Sees transplant team. Patient is rejecting transplant and is being managed for this.      5. Essential hypertension  Overview:  At goal on losartan 50 mg bid, asymptomatic    Sees cardiology    Continue current Rx meds        6. Acute on chronic combined systolic and diastolic congestive heart failure  Overview:  S/p heart transplant in 2022. Followed closely by cardiology and transplant team. Bp is at goal on losartan 50 mg bid    Continue current Rx meds        7. Acquired hypothyroidism  Overview:  tsh at goal on current dose of synthroid.     Continue current Rx meds           According to ACS calculator, patient is above average for death, serious complication, and cardiac complication. Patient needs cardiac clearance before proceeding with surgery as she has CHF and is a heart transplant recipient  Labs reviewed and unremarkable  EKG abnormal as expected    Follow up: Follow up for 6 mts medicare wellness.      Care Plan/Goals: Reviewed   Goals    None

## 2025-02-21 ENCOUNTER — DOCUMENTATION ONLY (OUTPATIENT)
Dept: FAMILY MEDICINE | Facility: CLINIC | Age: 65
End: 2025-02-21
Payer: MEDICARE

## 2025-02-23 LAB
ALBUMIN SERPL-MCNC: 4.9 G/DL (ref 3.9–4.9)
ALP SERPL-CCNC: 163 IU/L (ref 44–121)
ALT SERPL-CCNC: 16 IU/L (ref 0–32)
AST SERPL-CCNC: 16 IU/L (ref 0–40)
BASOPHILS # BLD AUTO: 0.1 X10E3/UL (ref 0–0.2)
BASOPHILS NFR BLD AUTO: 1 %
BILIRUB SERPL-MCNC: 0.3 MG/DL (ref 0–1.2)
BUN SERPL-MCNC: 17 MG/DL (ref 8–27)
BUN/CREAT SERPL: 22 (ref 12–28)
CALCIUM SERPL-MCNC: 10 MG/DL (ref 8.7–10.3)
CHLORIDE SERPL-SCNC: 99 MMOL/L (ref 96–106)
CO2 SERPL-SCNC: 23 MMOL/L (ref 20–29)
CREAT SERPL-MCNC: 0.78 MG/DL (ref 0.57–1)
EOSINOPHIL # BLD AUTO: 0.5 X10E3/UL (ref 0–0.4)
EOSINOPHIL NFR BLD AUTO: 7 %
ERYTHROCYTE [DISTWIDTH] IN BLOOD BY AUTOMATED COUNT: 13 % (ref 11.7–15.4)
EST. GFR  (NO RACE VARIABLE): 83 ML/MIN/1.73
FACT IX ACT/NOR PPP: 134 % (ref 60–177)
FACT VIII ACT/NOR PPP: 138 % (ref 56–140)
FACT XI ACT/NOR PPP: 129 % (ref 60–150)
FACT XII ACT/NOR PPP: 121 % (ref 50–150)
GLOBULIN SER CALC-MCNC: 1.9 G/DL (ref 1.5–4.5)
GLUCOSE SERPL-MCNC: 90 MG/DL (ref 70–99)
HCT VFR BLD AUTO: 45.2 % (ref 34–46.6)
HGB BLD-MCNC: 14.1 G/DL (ref 11.1–15.9)
IMM GRANULOCYTES NFR BLD AUTO: 0 %
INR PPP: 1 (ref 0.9–1.2)
LA 2 SCREEN W REFLEX-IMP: NORMAL
LYMPHOCYTES # BLD AUTO: 1.7 X10E3/UL (ref 0.7–3.1)
LYMPHOCYTES NFR BLD AUTO: 23 %
MCH RBC QN AUTO: 28.6 PG (ref 26.6–33)
MCHC RBC AUTO-ENTMCNC: 31.2 G/DL (ref 31.5–35.7)
MCV RBC AUTO: 92 FL (ref 79–97)
MONOCYTES # BLD AUTO: 0.6 X10E3/UL (ref 0.1–0.9)
MONOCYTES NFR BLD AUTO: 8 %
NEUTROPHILS # BLD AUTO: 4.5 X10E3/UL (ref 1.4–7)
NEUTROPHILS NFR BLD AUTO: 61 %
PLATELET # BLD AUTO: 183 X10E3/UL (ref 150–450)
POTASSIUM SERPL-SCNC: 5.1 MMOL/L (ref 3.5–5.2)
PROT SERPL-MCNC: 6.8 G/DL (ref 6–8.5)
PROTHROMBIN TIME: 10.2 SEC (ref 9.1–12)
RBC # BLD AUTO: 4.93 X10E6/UL (ref 3.77–5.28)
SCREEN APTT: 27.8 SEC (ref 0–43.5)
SCREEN DRVVT: 35.1 SEC (ref 0–47)
SODIUM SERPL-SCNC: 138 MMOL/L (ref 134–144)
SPECIMEN STATUS REPORT: NORMAL
WBC # BLD AUTO: 7.4 X10E3/UL (ref 3.4–10.8)

## 2025-02-25 ENCOUNTER — TELEPHONE (OUTPATIENT)
Dept: FAMILY MEDICINE | Facility: CLINIC | Age: 65
End: 2025-02-25
Payer: MEDICARE

## 2025-02-25 NOTE — TELEPHONE ENCOUNTER
"----- Message from Ana sent at 2/24/2025  1:46 PM CST -----  Who Called: Trixie White is requesting assistance/information from provider's office.Symptoms (please be specific): n/a How long has patient had these symptoms:  n/aList of preferred pharmacies on file (remove unneeded): Preferred Method of Contact: Phone CallPatient's Preferred Phone Number on File: [phone] Best Call Back Number, if different:Additional Information: pt needs the surgery "denial" paper work mailed to the patient and faxed to:[facility] at [phone] MERCEDES [place]ern[place], for insurance purposes.  "

## 2025-02-25 NOTE — TELEPHONE ENCOUNTER
Spoke to pt. She asked that our office fax her last office note to [facility] as this is where she is followed for her heart transplant. Office note faxed to the information provided by pt in the original message.

## 2025-05-12 ENCOUNTER — PATIENT OUTREACH (OUTPATIENT)
Facility: CLINIC | Age: 65
End: 2025-05-12
Payer: MEDICARE

## 2025-06-17 ENCOUNTER — TELEPHONE (OUTPATIENT)
Dept: PRIMARY CARE CLINIC | Facility: CLINIC | Age: 65
End: 2025-06-17
Payer: MEDICARE

## 2025-06-17 NOTE — TELEPHONE ENCOUNTER
Copied from CRM #[id number]. Topic: Appointments - Appointment Scheduling  >> Jun 17, 2025 12:57 PM Daniela wrote:  Who Called: Trixie Toth    Caller is requesting a sooner appointment. Caller declined first available appointment listed below. Caller will not accept being placed on the waitlist and is requesting a message be sent to doctor.    When is the first available appointment?aug 1 at 8:40   Options offered (Virtual Visit, Urgent Care):   Symptoms:      Preferred Method of Contact: Phone Call  Patient's Preferred Phone Number on File: [phone]   Best Call Back Number, if different:  Additional Information: Pt would like a sooner appt. Late June ,early July. Pt was seeing Dr Kasia Polo. Please Advise.